# Patient Record
Sex: MALE | Race: WHITE | NOT HISPANIC OR LATINO | Employment: OTHER | URBAN - METROPOLITAN AREA
[De-identification: names, ages, dates, MRNs, and addresses within clinical notes are randomized per-mention and may not be internally consistent; named-entity substitution may affect disease eponyms.]

---

## 2019-05-20 ENCOUNTER — TELEPHONE (OUTPATIENT)
Dept: GASTROENTEROLOGY | Facility: CLINIC | Age: 64
End: 2019-05-20

## 2019-05-20 NOTE — TELEPHONE ENCOUNTER
----- Message from Caitlin Bonilla sent at 5/20/2019  3:11 PM CDT -----  Contact: Lázaro  Type:  RX Refill Request    Who Called:  patient  Refill or New Rx:  refill  RX Name and Strength:  Omeprazole 20mg  How is the patient currently taking it? (ex. 1XDay):  BID  Is this a 30 day or 90 day RX:  30  Preferred Pharmacy with phone number:      [id number] - [place], [place] - [address]  [address]  [place] [address]  Phone: [phone] Fax: [phone]    Local or Mail Order:  local  Ordering Provider:  Shon Vázquez Call Back Number:  [phone]  Additional Information:  Patient will be out of medication on Friday--please send it refill--please advise--thank you

## 2019-05-20 NOTE — TELEPHONE ENCOUNTER
Returned pt phone call regarding refill. Explained to pt that he needed to be an established pt at [facility] in order to have Dr. Menendez write a script. Offered pt an earlier appt, but he stated he is on jury duty this week. Informed pt he could buy some OTC to hold him over for the week. Pt verbalized understanding.

## 2019-10-18 ENCOUNTER — HOSPITAL ENCOUNTER (INPATIENT)
Facility: HOSPITAL | Age: 64
LOS: 6 days | Discharge: HOSPICE | DRG: 273 | End: 2019-10-24
Attending: INTERNAL MEDICINE | Admitting: INTERNAL MEDICINE
Payer: MEDICARE

## 2019-10-18 DIAGNOSIS — Z79.4 TYPE 2 DIABETES MELLITUS WITH DIABETIC POLYNEUROPATHY, WITH LONG-TERM CURRENT USE OF INSULIN: ICD-10-CM

## 2019-10-18 DIAGNOSIS — I47.20 VT (VENTRICULAR TACHYCARDIA): ICD-10-CM

## 2019-10-18 DIAGNOSIS — I25.10 CORONARY ARTERY DISEASE INVOLVING NATIVE CORONARY ARTERY OF NATIVE HEART WITHOUT ANGINA PECTORIS: ICD-10-CM

## 2019-10-18 DIAGNOSIS — I50.9 HEART FAILURE: ICD-10-CM

## 2019-10-18 DIAGNOSIS — E11.42 TYPE 2 DIABETES MELLITUS WITH DIABETIC POLYNEUROPATHY, WITH LONG-TERM CURRENT USE OF INSULIN: ICD-10-CM

## 2019-10-18 DIAGNOSIS — I47.20 VENTRICULAR TACHYCARDIA: ICD-10-CM

## 2019-10-18 DIAGNOSIS — Z51.5 PALLIATIVE CARE ENCOUNTER: ICD-10-CM

## 2019-10-18 DIAGNOSIS — I73.9 PAD (PERIPHERAL ARTERY DISEASE): ICD-10-CM

## 2019-10-18 DIAGNOSIS — I25.5 ISCHEMIC CARDIOMYOPATHY: ICD-10-CM

## 2019-10-18 DIAGNOSIS — I47.20 V-TACH: ICD-10-CM

## 2019-10-18 DIAGNOSIS — E87.1 HYPONATREMIA: ICD-10-CM

## 2019-10-18 DIAGNOSIS — Z71.89 GOALS OF CARE, COUNSELING/DISCUSSION: ICD-10-CM

## 2019-10-18 DIAGNOSIS — F41.9 ANXIETY: ICD-10-CM

## 2019-10-18 DIAGNOSIS — R06.00 DYSPNEA, UNSPECIFIED TYPE: ICD-10-CM

## 2019-10-18 DIAGNOSIS — I50.23 ACUTE ON CHRONIC SYSTOLIC HEART FAILURE: ICD-10-CM

## 2019-10-18 DIAGNOSIS — I73.9 PVD (PERIPHERAL VASCULAR DISEASE): ICD-10-CM

## 2019-10-18 DIAGNOSIS — I47.20 SUSTAINED VENTRICULAR TACHYCARDIA: ICD-10-CM

## 2019-10-18 DIAGNOSIS — E66.01 CLASS 2 SEVERE OBESITY DUE TO EXCESS CALORIES WITH SERIOUS COMORBIDITY AND BODY MASS INDEX (BMI) OF 39.0 TO 39.9 IN ADULT: ICD-10-CM

## 2019-10-18 DIAGNOSIS — E78.5 HYPERLIPIDEMIA WITH TARGET LDL LESS THAN 70: ICD-10-CM

## 2019-10-18 PROBLEM — Z45.02 IMPLANTABLE CARDIOVERTER-DEFIBRILLATOR (ICD) GENERATOR END OF LIFE: Status: ACTIVE | Noted: 2018-05-18

## 2019-10-18 PROBLEM — J96.01 ACUTE HYPOXEMIC RESPIRATORY FAILURE: Status: ACTIVE | Noted: 2019-10-18

## 2019-10-18 PROBLEM — D72.829 LEUKOCYTOSIS: Status: RESOLVED | Noted: 2019-10-16 | Resolved: 2019-10-18

## 2019-10-18 PROBLEM — E11.49 DIABETES MELLITUS WITH NEUROLOGICAL MANIFESTATIONS: Status: ACTIVE | Noted: 2019-10-16

## 2019-10-18 PROBLEM — D72.829 LEUKOCYTOSIS: Status: ACTIVE | Noted: 2019-10-16

## 2019-10-18 PROBLEM — Z95.1 HISTORY OF CORONARY ARTERY BYPASS GRAFT X 3: Status: ACTIVE | Noted: 2019-10-16

## 2019-10-18 PROBLEM — N39.0 UTI (URINARY TRACT INFECTION): Status: ACTIVE | Noted: 2019-10-18

## 2019-10-18 PROBLEM — T78.3XXA ANGIOEDEMA: Status: ACTIVE | Noted: 2019-10-16

## 2019-10-18 LAB
ALBUMIN SERPL BCP-MCNC: 2.9 G/DL (ref 3.5–5.2)
ALP SERPL-CCNC: 128 U/L (ref 55–135)
ALT SERPL W/O P-5'-P-CCNC: 8 U/L (ref 10–44)
ANION GAP SERPL CALC-SCNC: 12 MMOL/L (ref 8–16)
ANION GAP SERPL CALC-SCNC: 12 MMOL/L (ref 8–16)
APTT BLDCRRT: 28 SEC (ref 21–32)
AST SERPL-CCNC: 15 U/L (ref 10–40)
BILIRUB SERPL-MCNC: 0.4 MG/DL (ref 0.1–1)
BNP SERPL-MCNC: 261 PG/ML (ref 0–99)
BUN SERPL-MCNC: 56 MG/DL (ref 8–23)
BUN SERPL-MCNC: 56 MG/DL (ref 8–23)
CALCIUM SERPL-MCNC: 9.6 MG/DL (ref 8.7–10.5)
CALCIUM SERPL-MCNC: 9.6 MG/DL (ref 8.7–10.5)
CHLORIDE SERPL-SCNC: 89 MMOL/L (ref 95–110)
CHLORIDE SERPL-SCNC: 89 MMOL/L (ref 95–110)
CO2 SERPL-SCNC: 27 MMOL/L (ref 23–29)
CO2 SERPL-SCNC: 27 MMOL/L (ref 23–29)
CREAT SERPL-MCNC: 1.3 MG/DL (ref 0.5–1.4)
CREAT SERPL-MCNC: 1.3 MG/DL (ref 0.5–1.4)
EST. GFR  (AFRICAN AMERICAN): >60 ML/MIN/1.73 M^2
EST. GFR  (AFRICAN AMERICAN): >60 ML/MIN/1.73 M^2
EST. GFR  (NON AFRICAN AMERICAN): 57.7 ML/MIN/1.73 M^2
EST. GFR  (NON AFRICAN AMERICAN): 57.7 ML/MIN/1.73 M^2
GLUCOSE SERPL-MCNC: 205 MG/DL (ref 70–110)
GLUCOSE SERPL-MCNC: 205 MG/DL (ref 70–110)
INR PPP: 1.3 (ref 0.8–1.2)
LACTATE SERPL-SCNC: 1.2 MMOL/L (ref 0.5–2.2)
LIPASE SERPL-CCNC: 13 U/L (ref 4–60)
MAGNESIUM SERPL-MCNC: 2.4 MG/DL (ref 1.6–2.6)
PHOSPHATE SERPL-MCNC: 3 MG/DL (ref 2.7–4.5)
POCT GLUCOSE: 183 MG/DL (ref 70–110)
POTASSIUM SERPL-SCNC: 4 MMOL/L (ref 3.5–5.1)
POTASSIUM SERPL-SCNC: 4 MMOL/L (ref 3.5–5.1)
PROT SERPL-MCNC: 7.2 G/DL (ref 6–8.4)
PROTHROMBIN TIME: 12.7 SEC (ref 9–12.5)
SODIUM SERPL-SCNC: 128 MMOL/L (ref 136–145)
SODIUM SERPL-SCNC: 128 MMOL/L (ref 136–145)
TROPONIN I SERPL DL<=0.01 NG/ML-MCNC: 0.01 NG/ML (ref 0–0.03)

## 2019-10-18 PROCEDURE — 85610 PROTHROMBIN TIME: CPT

## 2019-10-18 PROCEDURE — 84484 ASSAY OF TROPONIN QUANT: CPT

## 2019-10-18 PROCEDURE — 83036 HEMOGLOBIN GLYCOSYLATED A1C: CPT

## 2019-10-18 PROCEDURE — 83605 ASSAY OF LACTIC ACID: CPT

## 2019-10-18 PROCEDURE — 80053 COMPREHEN METABOLIC PANEL: CPT

## 2019-10-18 PROCEDURE — 63600175 PHARM REV CODE 636 W HCPCS: Performed by: STUDENT IN AN ORGANIZED HEALTH CARE EDUCATION/TRAINING PROGRAM

## 2019-10-18 PROCEDURE — 36415 COLL VENOUS BLD VENIPUNCTURE: CPT

## 2019-10-18 PROCEDURE — 20000000 HC ICU ROOM

## 2019-10-18 PROCEDURE — 85025 COMPLETE CBC W/AUTO DIFF WBC: CPT

## 2019-10-18 PROCEDURE — 93010 ELECTROCARDIOGRAM REPORT: CPT | Mod: ,,, | Performed by: INTERNAL MEDICINE

## 2019-10-18 PROCEDURE — 85730 THROMBOPLASTIN TIME PARTIAL: CPT

## 2019-10-18 PROCEDURE — 93010 EKG 12-LEAD: ICD-10-PCS | Mod: ,,, | Performed by: INTERNAL MEDICINE

## 2019-10-18 PROCEDURE — 84100 ASSAY OF PHOSPHORUS: CPT

## 2019-10-18 PROCEDURE — 83735 ASSAY OF MAGNESIUM: CPT

## 2019-10-18 PROCEDURE — 83880 ASSAY OF NATRIURETIC PEPTIDE: CPT

## 2019-10-18 PROCEDURE — 83690 ASSAY OF LIPASE: CPT

## 2019-10-18 PROCEDURE — 93005 ELECTROCARDIOGRAM TRACING: CPT

## 2019-10-18 RX ORDER — DORZOLAMIDE HYDROCHLORIDE AND TIMOLOL MALEATE 20; 5 MG/ML; MG/ML
SOLUTION/ DROPS OPHTHALMIC
COMMUNITY
Start: 2019-07-30

## 2019-10-18 RX ORDER — GEMFIBROZIL 600 MG/1
TABLET, FILM COATED ORAL
Status: ON HOLD | COMMUNITY
Start: 2019-07-29 | End: 2019-10-24 | Stop reason: HOSPADM

## 2019-10-18 RX ORDER — SODIUM CHLORIDE 0.9 % (FLUSH) 0.9 %
10 SYRINGE (ML) INJECTION
Status: DISCONTINUED | OUTPATIENT
Start: 2019-10-18 | End: 2019-10-24 | Stop reason: HOSPADM

## 2019-10-18 RX ORDER — HYDROXYZINE PAMOATE 50 MG/1
CAPSULE ORAL
Refills: 3 | COMMUNITY
Start: 2019-10-07

## 2019-10-18 RX ORDER — CARVEDILOL 12.5 MG/1
12.5 TABLET ORAL 2 TIMES DAILY
Status: DISCONTINUED | OUTPATIENT
Start: 2019-10-19 | End: 2019-10-22

## 2019-10-18 RX ORDER — ASPIRIN 81 MG/1
81 TABLET ORAL DAILY
Status: DISCONTINUED | OUTPATIENT
Start: 2019-10-19 | End: 2019-10-24 | Stop reason: HOSPADM

## 2019-10-18 RX ORDER — CARVEDILOL 25 MG/1
TABLET ORAL
Status: ON HOLD | COMMUNITY
Start: 2019-09-25 | End: 2019-10-24 | Stop reason: HOSPADM

## 2019-10-18 RX ORDER — LIDOCAINE HYDROCHLORIDE ANHYDROUS AND DEXTROSE MONOHYDRATE .8; 5 G/100ML; G/100ML
2 INJECTION, SOLUTION INTRAVENOUS CONTINUOUS
Status: DISCONTINUED | OUTPATIENT
Start: 2019-10-18 | End: 2019-10-21

## 2019-10-18 RX ORDER — KETOROLAC TROMETHAMINE 10 MG/1
TABLET, FILM COATED ORAL
Refills: 0 | COMMUNITY
Start: 2019-10-01

## 2019-10-18 RX ORDER — OMEPRAZOLE 20 MG/1
CAPSULE, DELAYED RELEASE ORAL
COMMUNITY
Start: 2019-09-25

## 2019-10-18 RX ORDER — INSULIN ASPART 100 [IU]/ML
INJECTION, SOLUTION INTRAVENOUS; SUBCUTANEOUS
COMMUNITY
Start: 2019-08-06

## 2019-10-18 RX ORDER — QUETIAPINE FUMARATE 100 MG/1
100 TABLET, FILM COATED ORAL
COMMUNITY

## 2019-10-18 RX ORDER — GABAPENTIN 300 MG/1
300 CAPSULE ORAL 2 TIMES DAILY
Refills: 3 | COMMUNITY
Start: 2019-09-24

## 2019-10-18 RX ORDER — INSULIN ASPART 100 [IU]/ML
3 INJECTION, SOLUTION INTRAVENOUS; SUBCUTANEOUS
Status: DISCONTINUED | OUTPATIENT
Start: 2019-10-19 | End: 2019-10-24 | Stop reason: HOSPADM

## 2019-10-18 RX ORDER — ISOPROPYL ALCOHOL 0.75 G/1
SWAB TOPICAL
COMMUNITY
Start: 2019-09-20

## 2019-10-18 RX ORDER — FUROSEMIDE 10 MG/ML
80 INJECTION INTRAMUSCULAR; INTRAVENOUS 3 TIMES DAILY
Status: DISCONTINUED | OUTPATIENT
Start: 2019-10-19 | End: 2019-10-19

## 2019-10-18 RX ORDER — ERGOCALCIFEROL 1.25 MG/1
CAPSULE ORAL
Status: ON HOLD | COMMUNITY
Start: 2019-09-03 | End: 2019-10-24 | Stop reason: HOSPADM

## 2019-10-18 RX ORDER — GLUCAGON 1 MG
1 KIT INJECTION
Status: DISCONTINUED | OUTPATIENT
Start: 2019-10-18 | End: 2019-10-24

## 2019-10-18 RX ORDER — AMLODIPINE BESYLATE 2.5 MG/1
TABLET ORAL
Status: ON HOLD | COMMUNITY
Start: 2019-07-29 | End: 2019-10-24 | Stop reason: HOSPADM

## 2019-10-18 RX ORDER — FLUOXETINE HYDROCHLORIDE 20 MG/1
60 CAPSULE ORAL
COMMUNITY

## 2019-10-18 RX ORDER — BACLOFEN 20 MG/1
TABLET ORAL
Refills: 0 | COMMUNITY
Start: 2019-10-01

## 2019-10-18 RX ADMIN — LIDOCAINE HYDROCHLORIDE 3 MG/MIN: 8 INJECTION, SOLUTION INTRAVENOUS at 11:10

## 2019-10-18 RX ADMIN — AMIODARONE HYDROCHLORIDE 1 MG/MIN: 1.8 INJECTION, SOLUTION INTRAVENOUS at 09:10

## 2019-10-19 LAB
ALBUMIN SERPL BCP-MCNC: 2.8 G/DL (ref 3.5–5.2)
ALP SERPL-CCNC: 124 U/L (ref 55–135)
ALT SERPL W/O P-5'-P-CCNC: 9 U/L (ref 10–44)
ANION GAP SERPL CALC-SCNC: 14 MMOL/L (ref 8–16)
ANION GAP SERPL CALC-SCNC: 16 MMOL/L (ref 8–16)
ASCENDING AORTA: 3.02 CM
AST SERPL-CCNC: 14 U/L (ref 10–40)
AV INDEX (PROSTH): 0.74
AV MEAN GRADIENT: 3 MMHG
AV PEAK GRADIENT: 5 MMHG
AV VALVE AREA: 3.51 CM2
AV VELOCITY RATIO: 0.69
BASOPHILS # BLD AUTO: 0.02 K/UL (ref 0–0.2)
BASOPHILS # BLD AUTO: 0.02 K/UL (ref 0–0.2)
BASOPHILS NFR BLD: 0.2 % (ref 0–1.9)
BASOPHILS NFR BLD: 0.2 % (ref 0–1.9)
BILIRUB SERPL-MCNC: 0.3 MG/DL (ref 0.1–1)
BSA FOR ECHO PROCEDURE: 2.43 M2
BUN SERPL-MCNC: 56 MG/DL (ref 8–23)
BUN SERPL-MCNC: 57 MG/DL (ref 8–23)
CALCIUM SERPL-MCNC: 10 MG/DL (ref 8.7–10.5)
CALCIUM SERPL-MCNC: 9.9 MG/DL (ref 8.7–10.5)
CHLORIDE SERPL-SCNC: 88 MMOL/L (ref 95–110)
CHLORIDE SERPL-SCNC: 91 MMOL/L (ref 95–110)
CHOLEST SERPL-MCNC: 200 MG/DL (ref 120–199)
CHOLEST/HDLC SERPL: 11.8 {RATIO} (ref 2–5)
CO2 SERPL-SCNC: 26 MMOL/L (ref 23–29)
CO2 SERPL-SCNC: 26 MMOL/L (ref 23–29)
CREAT SERPL-MCNC: 1.4 MG/DL (ref 0.5–1.4)
CREAT SERPL-MCNC: 1.6 MG/DL (ref 0.5–1.4)
CV ECHO LV RWT: 0.36 CM
DIFFERENTIAL METHOD: ABNORMAL
DIFFERENTIAL METHOD: ABNORMAL
DOP CALC AO PEAK VEL: 1.08 M/S
DOP CALC AO VTI: 15.18 CM
DOP CALC LVOT AREA: 4.8 CM2
DOP CALC LVOT DIAMETER: 2.46 CM
DOP CALC LVOT PEAK VEL: 0.74 M/S
DOP CALC LVOT STROKE VOLUME: 53.3 CM3
DOP CALCLVOT PEAK VEL VTI: 11.22 CM
E WAVE DECELERATION TIME: 176.84 MSEC
E/A RATIO: 0.32
E/E' RATIO: 7 M/S
ECHO LV POSTERIOR WALL: 1.11 CM (ref 0.6–1.1)
EOSINOPHIL # BLD AUTO: 0.1 K/UL (ref 0–0.5)
EOSINOPHIL # BLD AUTO: 0.1 K/UL (ref 0–0.5)
EOSINOPHIL NFR BLD: 0.7 % (ref 0–8)
EOSINOPHIL NFR BLD: 0.9 % (ref 0–8)
ERYTHROCYTE [DISTWIDTH] IN BLOOD BY AUTOMATED COUNT: 12.1 % (ref 11.5–14.5)
ERYTHROCYTE [DISTWIDTH] IN BLOOD BY AUTOMATED COUNT: 12.3 % (ref 11.5–14.5)
EST. GFR  (AFRICAN AMERICAN): 51.9 ML/MIN/1.73 M^2
EST. GFR  (AFRICAN AMERICAN): >60 ML/MIN/1.73 M^2
EST. GFR  (NON AFRICAN AMERICAN): 44.9 ML/MIN/1.73 M^2
EST. GFR  (NON AFRICAN AMERICAN): 52.7 ML/MIN/1.73 M^2
ESTIMATED AVG GLUCOSE: 154 MG/DL (ref 68–131)
FRACTIONAL SHORTENING: 9 % (ref 28–44)
GLUCOSE SERPL-MCNC: 248 MG/DL (ref 70–110)
GLUCOSE SERPL-MCNC: 293 MG/DL (ref 70–110)
HBA1C MFR BLD HPLC: 7 % (ref 4–5.6)
HCT VFR BLD AUTO: 39 % (ref 40–54)
HCT VFR BLD AUTO: 40.8 % (ref 40–54)
HDLC SERPL-MCNC: 17 MG/DL (ref 40–75)
HDLC SERPL: 8.5 % (ref 20–50)
HGB BLD-MCNC: 12.6 G/DL (ref 14–18)
HGB BLD-MCNC: 13 G/DL (ref 14–18)
IMM GRANULOCYTES # BLD AUTO: 0.06 K/UL (ref 0–0.04)
IMM GRANULOCYTES # BLD AUTO: 0.08 K/UL (ref 0–0.04)
IMM GRANULOCYTES NFR BLD AUTO: 0.5 % (ref 0–0.5)
IMM GRANULOCYTES NFR BLD AUTO: 0.7 % (ref 0–0.5)
INTERVENTRICULAR SEPTUM: 0.97 CM (ref 0.6–1.1)
IVRT: 0.1 MSEC
LA MAJOR: 6 CM
LA MINOR: 5.96 CM
LA WIDTH: 4.55 CM
LDLC SERPL CALC-MCNC: 110.2 MG/DL (ref 63–159)
LEFT ATRIUM SIZE: 4.29 CM
LEFT ATRIUM VOLUME INDEX: 42.5 ML/M2
LEFT ATRIUM VOLUME: 99.22 CM3
LEFT INTERNAL DIMENSION IN SYSTOLE: 5.61 CM (ref 2.1–4)
LEFT VENTRICLE DIASTOLIC VOLUME INDEX: 81.21 ML/M2
LEFT VENTRICLE DIASTOLIC VOLUME: 189.68 ML
LEFT VENTRICLE MASS INDEX: 116 G/M2
LEFT VENTRICLE SYSTOLIC VOLUME INDEX: 66 ML/M2
LEFT VENTRICLE SYSTOLIC VOLUME: 154.15 ML
LEFT VENTRICULAR INTERNAL DIMENSION IN DIASTOLE: 6.14 CM (ref 3.5–6)
LEFT VENTRICULAR MASS: 270.12 G
LV LATERAL E/E' RATIO: 5.83 M/S
LV SEPTAL E/E' RATIO: 8.75 M/S
LYMPHOCYTES # BLD AUTO: 0.9 K/UL (ref 1–4.8)
LYMPHOCYTES # BLD AUTO: 1.4 K/UL (ref 1–4.8)
LYMPHOCYTES NFR BLD: 12 % (ref 18–48)
LYMPHOCYTES NFR BLD: 7.7 % (ref 18–48)
MAGNESIUM SERPL-MCNC: 2 MG/DL (ref 1.6–2.6)
MAGNESIUM SERPL-MCNC: 2.2 MG/DL (ref 1.6–2.6)
MCH RBC QN AUTO: 28.4 PG (ref 27–31)
MCH RBC QN AUTO: 28.6 PG (ref 27–31)
MCHC RBC AUTO-ENTMCNC: 31.9 G/DL (ref 32–36)
MCHC RBC AUTO-ENTMCNC: 32.3 G/DL (ref 32–36)
MCV RBC AUTO: 89 FL (ref 82–98)
MCV RBC AUTO: 89 FL (ref 82–98)
MONOCYTES # BLD AUTO: 1.3 K/UL (ref 0.3–1)
MONOCYTES # BLD AUTO: 1.3 K/UL (ref 0.3–1)
MONOCYTES NFR BLD: 10.8 % (ref 4–15)
MONOCYTES NFR BLD: 10.9 % (ref 4–15)
MV PEAK A VEL: 1.1 M/S
MV PEAK E VEL: 0.35 M/S
NEUTROPHILS # BLD AUTO: 8.8 K/UL (ref 1.8–7.7)
NEUTROPHILS # BLD AUTO: 9.3 K/UL (ref 1.8–7.7)
NEUTROPHILS NFR BLD: 75.8 % (ref 38–73)
NEUTROPHILS NFR BLD: 79.6 % (ref 38–73)
NONHDLC SERPL-MCNC: 183 MG/DL
NRBC BLD-RTO: 0 /100 WBC
NRBC BLD-RTO: 0 /100 WBC
PHOSPHATE SERPL-MCNC: 3.3 MG/DL (ref 2.7–4.5)
PLATELET # BLD AUTO: 399 K/UL (ref 150–350)
PLATELET # BLD AUTO: 408 K/UL (ref 150–350)
PMV BLD AUTO: 10.4 FL (ref 9.2–12.9)
PMV BLD AUTO: 10.6 FL (ref 9.2–12.9)
POCT GLUCOSE: 206 MG/DL (ref 70–110)
POCT GLUCOSE: 232 MG/DL (ref 70–110)
POCT GLUCOSE: 285 MG/DL (ref 70–110)
POTASSIUM SERPL-SCNC: 3.7 MMOL/L (ref 3.5–5.1)
POTASSIUM SERPL-SCNC: 4 MMOL/L (ref 3.5–5.1)
PROT SERPL-MCNC: 7 G/DL (ref 6–8.4)
PULM VEIN S/D RATIO: 2.75
PV PEAK D VEL: 0.16 M/S
PV PEAK S VEL: 0.44 M/S
RA MAJOR: 5.34 CM
RA PRESSURE: 3 MMHG
RA WIDTH: 4.55 CM
RBC # BLD AUTO: 4.4 M/UL (ref 4.6–6.2)
RBC # BLD AUTO: 4.57 M/UL (ref 4.6–6.2)
RIGHT VENTRICULAR END-DIASTOLIC DIMENSION: 3.89 CM
RV TISSUE DOPPLER FREE WALL SYSTOLIC VELOCITY 1 (APICAL 4 CHAMBER VIEW): 11.14 CM/S
SINUS: 3.47 CM
SODIUM SERPL-SCNC: 130 MMOL/L (ref 136–145)
SODIUM SERPL-SCNC: 131 MMOL/L (ref 136–145)
STJ: 3.07 CM
TDI LATERAL: 0.06 M/S
TDI SEPTAL: 0.04 M/S
TDI: 0.05 M/S
TRICUSPID ANNULAR PLANE SYSTOLIC EXCURSION: 1.32 CM
TRIGL SERPL-MCNC: 364 MG/DL (ref 30–150)
WBC # BLD AUTO: 11.57 K/UL (ref 3.9–12.7)
WBC # BLD AUTO: 11.65 K/UL (ref 3.9–12.7)

## 2019-10-19 PROCEDURE — 99900035 HC TECH TIME PER 15 MIN (STAT)

## 2019-10-19 PROCEDURE — 99223 1ST HOSP IP/OBS HIGH 75: CPT | Mod: ,,, | Performed by: INTERNAL MEDICINE

## 2019-10-19 PROCEDURE — 25000003 PHARM REV CODE 250: Performed by: STUDENT IN AN ORGANIZED HEALTH CARE EDUCATION/TRAINING PROGRAM

## 2019-10-19 PROCEDURE — 80061 LIPID PANEL: CPT

## 2019-10-19 PROCEDURE — 84100 ASSAY OF PHOSPHORUS: CPT

## 2019-10-19 PROCEDURE — 99223 1ST HOSP IP/OBS HIGH 75: CPT | Mod: AI,GC,, | Performed by: INTERNAL MEDICINE

## 2019-10-19 PROCEDURE — 83735 ASSAY OF MAGNESIUM: CPT

## 2019-10-19 PROCEDURE — 80176 ASSAY OF LIDOCAINE: CPT

## 2019-10-19 PROCEDURE — 85025 COMPLETE CBC W/AUTO DIFF WBC: CPT

## 2019-10-19 PROCEDURE — 27000190 HC CPAP FULL FACE MASK W/VALVE

## 2019-10-19 PROCEDURE — 63600175 PHARM REV CODE 636 W HCPCS: Performed by: STUDENT IN AN ORGANIZED HEALTH CARE EDUCATION/TRAINING PROGRAM

## 2019-10-19 PROCEDURE — 25000003 PHARM REV CODE 250: Performed by: INTERNAL MEDICINE

## 2019-10-19 PROCEDURE — 80048 BASIC METABOLIC PNL TOTAL CA: CPT

## 2019-10-19 PROCEDURE — 63600175 PHARM REV CODE 636 W HCPCS

## 2019-10-19 PROCEDURE — 83735 ASSAY OF MAGNESIUM: CPT | Mod: 91

## 2019-10-19 PROCEDURE — 20000000 HC ICU ROOM

## 2019-10-19 PROCEDURE — 99223 PR INITIAL HOSPITAL CARE,LEVL III: ICD-10-PCS | Mod: AI,GC,, | Performed by: INTERNAL MEDICINE

## 2019-10-19 PROCEDURE — 94660 CPAP INITIATION&MGMT: CPT

## 2019-10-19 PROCEDURE — 99223 PR INITIAL HOSPITAL CARE,LEVL III: ICD-10-PCS | Mod: ,,, | Performed by: INTERNAL MEDICINE

## 2019-10-19 PROCEDURE — 84132 ASSAY OF SERUM POTASSIUM: CPT

## 2019-10-19 PROCEDURE — 80053 COMPREHEN METABOLIC PANEL: CPT

## 2019-10-19 RX ORDER — IBUPROFEN 200 MG
24 TABLET ORAL
Status: DISCONTINUED | OUTPATIENT
Start: 2019-10-19 | End: 2019-10-24 | Stop reason: HOSPADM

## 2019-10-19 RX ORDER — LORAZEPAM 2 MG/ML
2 INJECTION INTRAMUSCULAR ONCE
Status: COMPLETED | OUTPATIENT
Start: 2019-10-19 | End: 2019-10-19

## 2019-10-19 RX ORDER — MIDAZOLAM HYDROCHLORIDE 1 MG/ML
INJECTION INTRAMUSCULAR; INTRAVENOUS
Status: COMPLETED
Start: 2019-10-19 | End: 2019-10-19

## 2019-10-19 RX ORDER — ISOSORBIDE DINITRATE AND HYDRALAZINE HYDROCHLORIDE 37.5; 2 MG/1; MG/1
1 TABLET ORAL 3 TIMES DAILY
Status: DISCONTINUED | OUTPATIENT
Start: 2019-10-19 | End: 2019-10-19

## 2019-10-19 RX ORDER — ISOSORBIDE DINITRATE AND HYDRALAZINE HYDROCHLORIDE 37.5; 2 MG/1; MG/1
1 TABLET ORAL 3 TIMES DAILY
Status: DISCONTINUED | OUTPATIENT
Start: 2019-10-19 | End: 2019-10-24 | Stop reason: HOSPADM

## 2019-10-19 RX ORDER — MIDAZOLAM HYDROCHLORIDE 1 MG/ML
4 INJECTION INTRAMUSCULAR; INTRAVENOUS ONCE
Status: DISCONTINUED | OUTPATIENT
Start: 2019-10-19 | End: 2019-10-19

## 2019-10-19 RX ORDER — LORAZEPAM 2 MG/ML
INJECTION INTRAMUSCULAR
Status: COMPLETED
Start: 2019-10-19 | End: 2019-10-19

## 2019-10-19 RX ORDER — DORZOLAMIDE HCL 20 MG/ML
1 SOLUTION/ DROPS OPHTHALMIC 2 TIMES DAILY
Status: DISCONTINUED | OUTPATIENT
Start: 2019-10-19 | End: 2019-10-24 | Stop reason: HOSPADM

## 2019-10-19 RX ORDER — GLUCAGON 1 MG
1 KIT INJECTION
Status: DISCONTINUED | OUTPATIENT
Start: 2019-10-19 | End: 2019-10-24 | Stop reason: HOSPADM

## 2019-10-19 RX ORDER — IBUPROFEN 200 MG
16 TABLET ORAL
Status: DISCONTINUED | OUTPATIENT
Start: 2019-10-19 | End: 2019-10-24 | Stop reason: HOSPADM

## 2019-10-19 RX ORDER — GABAPENTIN 300 MG/1
300 CAPSULE ORAL NIGHTLY
Status: DISCONTINUED | OUTPATIENT
Start: 2019-10-19 | End: 2019-10-24 | Stop reason: HOSPADM

## 2019-10-19 RX ORDER — POTASSIUM CHLORIDE 20 MEQ/1
40 TABLET, EXTENDED RELEASE ORAL ONCE
Status: COMPLETED | OUTPATIENT
Start: 2019-10-19 | End: 2019-10-19

## 2019-10-19 RX ORDER — INSULIN ASPART 100 [IU]/ML
1-10 INJECTION, SOLUTION INTRAVENOUS; SUBCUTANEOUS
Status: DISCONTINUED | OUTPATIENT
Start: 2019-10-19 | End: 2019-10-24 | Stop reason: HOSPADM

## 2019-10-19 RX ORDER — MAGNESIUM SULFATE HEPTAHYDRATE 40 MG/ML
2 INJECTION, SOLUTION INTRAVENOUS ONCE
Status: COMPLETED | OUTPATIENT
Start: 2019-10-19 | End: 2019-10-19

## 2019-10-19 RX ORDER — DORZOLAMIDE HYDROCHLORIDE AND TIMOLOL MALEATE 20; 5 MG/ML; MG/ML
1 SOLUTION/ DROPS OPHTHALMIC 2 TIMES DAILY
Status: DISCONTINUED | OUTPATIENT
Start: 2019-10-19 | End: 2019-10-19

## 2019-10-19 RX ORDER — HYDROXYZINE PAMOATE 25 MG/1
50 CAPSULE ORAL EVERY 6 HOURS PRN
Status: DISCONTINUED | OUTPATIENT
Start: 2019-10-19 | End: 2019-10-24 | Stop reason: HOSPADM

## 2019-10-19 RX ORDER — TIMOLOL MALEATE 5 MG/ML
1 SOLUTION/ DROPS OPHTHALMIC 2 TIMES DAILY
Status: DISCONTINUED | OUTPATIENT
Start: 2019-10-19 | End: 2019-10-24 | Stop reason: HOSPADM

## 2019-10-19 RX ADMIN — AMIODARONE HYDROCHLORIDE 150 MG: 1.5 INJECTION, SOLUTION INTRAVENOUS at 05:10

## 2019-10-19 RX ADMIN — GABAPENTIN 300 MG: 300 CAPSULE ORAL at 11:10

## 2019-10-19 RX ADMIN — POTASSIUM CHLORIDE 40 MEQ: 1500 TABLET, EXTENDED RELEASE ORAL at 06:10

## 2019-10-19 RX ADMIN — LIDOCAINE HYDROCHLORIDE 3 MG/MIN: 8 INJECTION, SOLUTION INTRAVENOUS at 08:10

## 2019-10-19 RX ADMIN — FUROSEMIDE 80 MG: 10 INJECTION, SOLUTION INTRAMUSCULAR; INTRAVENOUS at 09:10

## 2019-10-19 RX ADMIN — DORZOLAMIDE HYDROCHLORIDE 1 DROP: 20 SOLUTION/ DROPS OPHTHALMIC at 08:10

## 2019-10-19 RX ADMIN — LORAZEPAM 2 MG: 2 INJECTION INTRAMUSCULAR; INTRAVENOUS at 06:10

## 2019-10-19 RX ADMIN — FUROSEMIDE 80 MG: 10 INJECTION, SOLUTION INTRAMUSCULAR; INTRAVENOUS at 08:10

## 2019-10-19 RX ADMIN — HYDRALAZINE HYDROCHLORIDE AND ISOSORBIDE DINITRATE 1 TABLET: 37.5; 2 TABLET, FILM COATED ORAL at 03:10

## 2019-10-19 RX ADMIN — MAGNESIUM SULFATE IN WATER 2 G: 40 INJECTION, SOLUTION INTRAVENOUS at 06:10

## 2019-10-19 RX ADMIN — INSULIN ASPART 3 UNITS: 100 INJECTION, SOLUTION INTRAVENOUS; SUBCUTANEOUS at 08:10

## 2019-10-19 RX ADMIN — DORZOLAMIDE HYDROCHLORIDE AND TIMOLOL MALEATE 1 DROP: 20; 5 SOLUTION/ DROPS OPHTHALMIC at 09:10

## 2019-10-19 RX ADMIN — INSULIN ASPART 4 UNITS: 100 INJECTION, SOLUTION INTRAVENOUS; SUBCUTANEOUS at 05:10

## 2019-10-19 RX ADMIN — TIMOLOL MALEATE 1 DROP: 5 SOLUTION OPHTHALMIC at 08:10

## 2019-10-19 RX ADMIN — AMIODARONE HYDROCHLORIDE 1 MG/MIN: 1.8 INJECTION, SOLUTION INTRAVENOUS at 08:10

## 2019-10-19 RX ADMIN — AMIODARONE HYDROCHLORIDE 1 MG/MIN: 1.8 INJECTION, SOLUTION INTRAVENOUS at 02:10

## 2019-10-19 RX ADMIN — CARVEDILOL 12.5 MG: 12.5 TABLET, FILM COATED ORAL at 08:10

## 2019-10-19 RX ADMIN — CARVEDILOL 12.5 MG: 12.5 TABLET, FILM COATED ORAL at 01:10

## 2019-10-19 RX ADMIN — AMIODARONE HYDROCHLORIDE 1 MG/MIN: 1.8 INJECTION, SOLUTION INTRAVENOUS at 03:10

## 2019-10-19 RX ADMIN — FUROSEMIDE 80 MG: 10 INJECTION, SOLUTION INTRAMUSCULAR; INTRAVENOUS at 01:10

## 2019-10-19 RX ADMIN — FUROSEMIDE 80 MG: 10 INJECTION, SOLUTION INTRAMUSCULAR; INTRAVENOUS at 04:10

## 2019-10-19 RX ADMIN — MIDAZOLAM HYDROCHLORIDE 4 MG: 1 INJECTION, SOLUTION INTRAMUSCULAR; INTRAVENOUS at 08:10

## 2019-10-19 RX ADMIN — CARVEDILOL 12.5 MG: 12.5 TABLET, FILM COATED ORAL at 09:10

## 2019-10-19 RX ADMIN — INSULIN ASPART 3 UNITS: 100 INJECTION, SOLUTION INTRAVENOUS; SUBCUTANEOUS at 12:10

## 2019-10-19 RX ADMIN — LIDOCAINE HYDROCHLORIDE 3 MG/MIN: 8 INJECTION, SOLUTION INTRAVENOUS at 09:10

## 2019-10-19 RX ADMIN — INSULIN ASPART 3 UNITS: 100 INJECTION, SOLUTION INTRAVENOUS; SUBCUTANEOUS at 04:10

## 2019-10-19 RX ADMIN — ASPIRIN 81 MG: 81 TABLET, COATED ORAL at 09:10

## 2019-10-19 RX ADMIN — HYDRALAZINE HYDROCHLORIDE AND ISOSORBIDE DINITRATE 1 TABLET: 37.5; 2 TABLET, FILM COATED ORAL at 08:10

## 2019-10-19 RX ADMIN — HYDROXYZINE PAMOATE 50 MG: 25 CAPSULE ORAL at 11:10

## 2019-10-19 RX ADMIN — AMIODARONE HYDROCHLORIDE 1 MG/MIN: 1.8 INJECTION, SOLUTION INTRAVENOUS at 09:10

## 2019-10-19 NOTE — PROGRESS NOTES
"Sustained monomorphic VT on telemetry. Pt dizzy, stated he "felt funny." Was shocked by AICD x1. Cardiology called to bedside STAT. WCTM.  "

## 2019-10-19 NOTE — PROGRESS NOTES
Paced rhythm on telemetry. Cardiology notified x2 to come see pt s/p AICD fire, states someone will come soon. DANIS GARCIA.

## 2019-10-19 NOTE — CONSULTS
[facility]  Cardiac Electrophysiology  Consult Note    Admission Date: 10/18/2019  Code Status: Full Code   Attending Provider: Rea Davila MD  Consulting Provider: Ced Mittal MD  Principal Problem:Sustained ventricular tachycardia    Inpatient consult to Electrophysiology  Consult performed by: Ced Mittal MD  Consult ordered by: Ced Mittal MD        Subjective:     Chief Complaint:  ventricular tachycardia     HPI:   Mr. Sarah is a 63 y.o. Male with ischemic CMP EF 15-20%; CAD CABG 3/2008 (Radial-OM, LIMA to LAD and TOD to dRCA) AICD for HFrEF. He presented to [facility] on 10/16/2019 with multiple AICD shocks. He noticed the shock around 1030 PM the night of admission. During ED visit, he had reported episode of sustained monomorphic ventricular tachycardia 160 bpm. Per report he had multiple AICD shock for his VT storm, and he started on Amiodarone and Lidocaine infusion. Subsequently he VTac stabilized and he was switched from Lidocaine to mexiletine 150 mg TID. He underwent LHC and showed patent Radial-OM; LIMA-LAD; TOD to distal right disease to mLCX with collaterals to RCA. 90% stenosis to mLCX. Ventricualgraphy showed EF 10-15%/ No intervention. He also underwent RHC with reported normal results. On 10/17/2019; he developed recurrent episodes wide complex tachycardias mostly consistent with Ventricular tachycardia; and continue on Amiodarone and Lidocaine infusion. ICD re-programmed to detect slower VT rate of 120 given slow VT with a rate of 120-150 with variable morphologies. On arrival to [facility], he was hemodynamically stable and oxygen saturation on nasal cannula 2-4 L with 95%.    Past Medical History:   Diagnosis Date    Acute on chronic systolic heart failure 10/16/2019    Cancer     Skin Cancer     CHF (congestive heart failure)     Chronic rhinitis 10/4/2016    Depression 5/25/2016    Diabetes mellitus     Encounter for blood  "transfusion     during pt open heart surgery 1998    History of hypertension 10/4/2016    Hypertension     Implantable cardioverter-defibrillator (ICD) generator end of life 2018    Leukocytosis 10/16/2019       Past Surgical History:   Procedure Laterality Date    ABDOMINAL SURGERY      hernia repair    CARDIAC SURGERY      open heart    EYE SURGERY      HERNIA REPAIR      SKIN BIOPSY         Review of patient's allergies indicates:   Allergen Reactions    Ace inhibitors Swelling    Adhesive Hives    Novolin 70/30 (semi-synthetic) Hives    Codeine Nausea And Vomiting    Statins-hmg-coa reductase inhibitors Swelling    Bupropion hcl Rash       No current facility-administered medications on file prior to encounter.      No current outpatient medications on file prior to encounter.     Family History       None          Tobacco Use    Smoking status: Former Smoker     Types: Cigarettes     Last attempt to quit: 3/18/2006     Years since quittin.5   Substance and Sexual Activity    Alcohol use: Yes     Comment: per pt "once in a while"    Drug use: Yes     Comment: per pt "Carri on Percocet 2 yrs ago in July"    Sexual activity: Not Currently     Review of Systems   Constitution: Positive for decreased appetite and malaise/fatigue. Negative for chills and night sweats.   HENT: Negative for congestion.    Cardiovascular: Negative for chest pain, irregular heartbeat and leg swelling.   Respiratory: Negative for cough and shortness of breath.    Endocrine: Negative for cold intolerance and heat intolerance.   Skin: Negative for rash.   Musculoskeletal: Negative for arthritis and back pain.   Gastrointestinal: Negative for abdominal pain, constipation and diarrhea.   Genitourinary: Negative for dysuria and hematuria.   Neurological: Negative for dizziness and headaches.   Psychiatric/Behavioral: Negative for altered mental status.     Objective:     Vital Signs (Most Recent):  Pulse: 60 " (10/18/19 2300)  Resp: (!) 23 (10/18/19 2300)  SpO2: (!) 94 % (10/18/19 2300) Vital Signs (24h Range):  Pulse:  [] 60  Resp:  [13-32] 23  SpO2:  [94 %-98 %] 94 %  BP: ()/(60-65) 95/61     Weight: 121.4 kg (267 lb 10.2 oz)  There is no height or weight on file to calculate BMI.    SpO2: (!) 94 %  O2 Device (Oxygen Therapy): nasal cannula w/ humidification    No intake or output data in the 24 hours ending 10/18/19 2357    Lines/Drains/Airways       Peripheral Intravenous Line                   Peripheral IV - Single Lumen 10/17/19 18 G Anterior;Left Upper Arm 1 day         Peripheral IV - Single Lumen 10/17/19 18 G Anterior;Right Upper Arm 1 day                    Physical Exam   Constitutional: He is oriented to person, place, and time. He appears well-nourished. No distress.   HENT:   Head: Normocephalic.   Eyes: Pupils are equal, round, and reactive to light.   Neck: No JVD present. No thyromegaly present.   Cardiovascular: Normal rate and regular rhythm. Exam reveals no friction rub.   No murmur heard.  Pulmonary/Chest: Effort normal. No respiratory distress. He has no wheezes.   Abdominal: Soft. He exhibits no distension. There is no tenderness.   Musculoskeletal: He exhibits no edema.   Neurological: He is alert and oriented to person, place, and time.   Skin:        ICD [facility]    Vitals reviewed.      Significant Labs:   CMP   Recent Labs   Lab 10/18/19  2151   *  128*   K 4.0  4.0   CL 89*  89*   CO2 27  27   *  205*   BUN 56*  56*   CREATININE 1.3  1.3   CALCIUM 9.6  9.6   PROT 7.2   ALBUMIN 2.9*   BILITOT 0.4   ALKPHOS 128   AST 15   ALT 8*   ANIONGAP 12  12   ESTGFRAFRICA >60.0  >60.0   EGFRNONAA 57.7*  57.7*     Assessment and Plan:     * Sustained ventricular tachycardia  - Unable to obtain the events from the device interrogating (was cleared from prior interrogating)  - DDx TdP in the setting of having multiple medication could affect his QTc (anti psychotic, and  SSRI); ischemic etiology; although non obstructive but diseases mLCX   - Continue on Amiodarone and Lidocaine current with repeated lab level of Lidocaine  - Plan discussed with primary and EP teams.     Thank you for your consult. EP team will follow-up with patient. Please contact us if you have any additional questions.    Ced Mittal MD  Cardiac Electrophysiology  [facility]

## 2019-10-19 NOTE — SUBJECTIVE & OBJECTIVE
"Past Medical History:   Diagnosis Date    Acute on chronic systolic heart failure 10/16/2019    Cancer     Skin Cancer     CHF (congestive heart failure)     Chronic rhinitis 10/4/2016    Depression 2016    Diabetes mellitus     Encounter for blood transfusion     during pt open heart surgery 1998    History of hypertension 10/4/2016    Hypertension     Implantable cardioverter-defibrillator (ICD) generator end of life 2018    Leukocytosis 10/16/2019       Past Surgical History:   Procedure Laterality Date    ABDOMINAL SURGERY      hernia repair    CARDIAC SURGERY      open heart    EYE SURGERY      HERNIA REPAIR      SKIN BIOPSY         Review of patient's allergies indicates:   Allergen Reactions    Ace inhibitors Swelling    Adhesive Hives    Novolin 70/30 (semi-synthetic) Hives    Codeine Nausea And Vomiting    Statins-hmg-coa reductase inhibitors Swelling    Bupropion hcl Rash       No current facility-administered medications on file prior to encounter.      No current outpatient medications on file prior to encounter.     Family History     None        Tobacco Use    Smoking status: Former Smoker     Types: Cigarettes     Last attempt to quit: 3/18/2006     Years since quittin.5   Substance and Sexual Activity    Alcohol use: Yes     Comment: per pt "once in a while"    Drug use: Yes     Comment: per pt "Carri on Percocet 2 yrs ago in July"    Sexual activity: Not Currently     Review of Systems   Constitution: Positive for decreased appetite and malaise/fatigue. Negative for chills and night sweats.   HENT: Negative for congestion.    Cardiovascular: Negative for chest pain, irregular heartbeat and leg swelling.   Respiratory: Negative for cough and shortness of breath.    Endocrine: Negative for cold intolerance and heat intolerance.   Skin: Negative for rash.   Musculoskeletal: Negative for arthritis and back pain.   Gastrointestinal: Negative for abdominal pain, " constipation and diarrhea.   Genitourinary: Negative for dysuria and hematuria.   Neurological: Negative for dizziness and headaches.   Psychiatric/Behavioral: Negative for altered mental status.     Objective:     Vital Signs (Most Recent):  Pulse: 60 (10/18/19 2300)  Resp: (!) 23 (10/18/19 2300)  SpO2: (!) 94 % (10/18/19 2300) Vital Signs (24h Range):  Pulse:  [] 60  Resp:  [13-32] 23  SpO2:  [94 %-98 %] 94 %  BP: ()/(60-65) 95/61     Weight: 121.4 kg (267 lb 10.2 oz)  There is no height or weight on file to calculate BMI.    SpO2: (!) 94 %  O2 Device (Oxygen Therapy): nasal cannula w/ humidification    No intake or output data in the 24 hours ending 10/18/19 2338    Lines/Drains/Airways     Peripheral Intravenous Line                 Peripheral IV - Single Lumen 10/17/19 18 G Anterior;Left Upper Arm 1 day         Peripheral IV - Single Lumen 10/17/19 18 G Anterior;Right Upper Arm 1 day                Physical Exam   Constitutional: He is oriented to person, place, and time. He appears well-nourished. No distress.   HENT:   Head: Normocephalic.   Eyes: Pupils are equal, round, and reactive to light.   Neck: No JVD present. No thyromegaly present.   Cardiovascular: Normal rate and regular rhythm. Exam reveals no friction rub.   No murmur heard.  Pulmonary/Chest: Effort normal. No respiratory distress. He has no wheezes.   Abdominal: Soft. He exhibits no distension. There is no tenderness.   Musculoskeletal: He exhibits no edema.   Neurological: He is alert and oriented to person, place, and time.   Skin:        ICD [facility]    Vitals reviewed.      Significant Labs:   CMP   Recent Labs   Lab 10/18/19  2151   *  128*   K 4.0  4.0   CL 89*  89*   CO2 27  27   *  205*   BUN 56*  56*   CREATININE 1.3  1.3   CALCIUM 9.6  9.6   PROT 7.2   ALBUMIN 2.9*   BILITOT 0.4   ALKPHOS 128   AST 15   ALT 8*   ANIONGAP 12  12   ESTGFRAFRICA >60.0  >60.0   EGFRNONAA 57.7*  57.7*

## 2019-10-19 NOTE — PROCEDURES
ICD Evaluation Report     10/19/2019     : [facility]     Reason for evaluation: Evaluation for ICD shocks     Initial Parameters  Mode: DDD  Base Rate 60  Pause 1 sec  Duration 0.5 min     A V   Pulse Amplitude  7.5 V 7.5 V   Pulse Width  1.5 ms 1.5 ms       Capture  A V   ACap Confirm  Off Off   Pulse Amp 3.0 V 2.5 V   Pulse Width 0.5 ms 0.5 ms   AutoSens On On   Sensitivity  Auto Auto     Events:  Unfortunately prior device interrogation cleared prior events and I was unable to obtain prior V Tach events.     Noris Mittal MD  Cardiology Fellow ([id number]-IV)  Pager: [phone]

## 2019-10-19 NOTE — SUBJECTIVE & OBJECTIVE
"Past Medical History:   Diagnosis Date    Acute on chronic systolic heart failure 10/16/2019    Cancer     Skin Cancer     CHF (congestive heart failure)     Chronic rhinitis 10/4/2016    Depression 2016    Diabetes mellitus     Encounter for blood transfusion     during pt open heart surgery 1998    History of hypertension 10/4/2016    Hypertension     Implantable cardioverter-defibrillator (ICD) generator end of life 2018    Leukocytosis 10/16/2019       Past Surgical History:   Procedure Laterality Date    ABDOMINAL SURGERY      hernia repair    CARDIAC SURGERY      open heart    EYE SURGERY      HERNIA REPAIR      SKIN BIOPSY         Review of patient's allergies indicates:   Allergen Reactions    Ace inhibitors Swelling    Adhesive Hives    Novolin 70/30 (semi-synthetic) Hives    Codeine Nausea And Vomiting    Statins-hmg-coa reductase inhibitors Swelling    Bupropion hcl Rash       No current facility-administered medications on file prior to encounter.      No current outpatient medications on file prior to encounter.     Family History     None        Tobacco Use    Smoking status: Former Smoker     Types: Cigarettes     Last attempt to quit: 3/18/2006     Years since quittin.5   Substance and Sexual Activity    Alcohol use: Yes     Comment: per pt "once in a while"    Drug use: Yes     Comment: per pt "Carri on Percocet 2 yrs ago in July"    Sexual activity: Not Currently     Review of Systems   Constitution: Positive for decreased appetite and malaise/fatigue. Negative for chills and night sweats.   HENT: Negative for congestion.    Cardiovascular: Negative for chest pain, irregular heartbeat and leg swelling.   Respiratory: Negative for cough and shortness of breath.    Endocrine: Negative for cold intolerance and heat intolerance.   Skin: Negative for rash.   Musculoskeletal: Negative for arthritis and back pain.   Gastrointestinal: Negative for abdominal pain, " constipation and diarrhea.   Genitourinary: Negative for dysuria and hematuria.   Neurological: Negative for dizziness and headaches.   Psychiatric/Behavioral: Negative for altered mental status.     Objective:     Vital Signs (Most Recent):  Pulse: 60 (10/18/19 2300)  Resp: (!) 23 (10/18/19 2300)  SpO2: (!) 94 % (10/18/19 2300) Vital Signs (24h Range):  Pulse:  [] 60  Resp:  [13-32] 23  SpO2:  [94 %-98 %] 94 %  BP: ()/(60-65) 95/61     Weight: 121.4 kg (267 lb 10.2 oz)  There is no height or weight on file to calculate BMI.    SpO2: (!) 94 %  O2 Device (Oxygen Therapy): nasal cannula w/ humidification    No intake or output data in the 24 hours ending 10/18/19 2357    Lines/Drains/Airways     Peripheral Intravenous Line                 Peripheral IV - Single Lumen 10/17/19 18 G Anterior;Left Upper Arm 1 day         Peripheral IV - Single Lumen 10/17/19 18 G Anterior;Right Upper Arm 1 day                Physical Exam   Constitutional: He is oriented to person, place, and time. He appears well-nourished. No distress.   HENT:   Head: Normocephalic.   Eyes: Pupils are equal, round, and reactive to light.   Neck: No JVD present. No thyromegaly present.   Cardiovascular: Normal rate and regular rhythm. Exam reveals no friction rub.   No murmur heard.  Pulmonary/Chest: Effort normal. No respiratory distress. He has no wheezes.   Abdominal: Soft. He exhibits no distension. There is no tenderness.   Musculoskeletal: He exhibits no edema.   Neurological: He is alert and oriented to person, place, and time.   Skin:        ICD [facility]    Vitals reviewed.      Significant Labs:   CMP   Recent Labs   Lab 10/18/19  2151   *  128*   K 4.0  4.0   CL 89*  89*   CO2 27  27   *  205*   BUN 56*  56*   CREATININE 1.3  1.3   CALCIUM 9.6  9.6   PROT 7.2   ALBUMIN 2.9*   BILITOT 0.4   ALKPHOS 128   AST 15   ALT 8*   ANIONGAP 12  12   ESTGFRAFRICA >60.0  >60.0   EGFRNONAA 57.7*  57.7*

## 2019-10-19 NOTE — ASSESSMENT & PLAN NOTE
- Acute exacerbation of heart failure could be from arrhythmia in the setting of having multiple V tach episodes, or from non-compliance with medication or diet restriction, less likely to be in ischemia in the absence of chest pain  - Prior TTE showed EF 15-20%with diastolic dysfunction.  - NYHA functional classification III, ACCF/AHA Stage of Heart Failure is C.  - Continue on GDMT with Coreg at lower dose and unable to have ACEi (allergy); will consider Hydralazine and nitrite when stable   - Grossly volume overloaded with CXR congestion, continue on IV lasix with monitor on UOP

## 2019-10-19 NOTE — HPI
Mr. Sarah is a 63 y.o. Male with ischemic CMP EF 15-20%; CAD CABG 3/2008 (Radial-OM, LIMA to LAD and TOD to dRCA) AICD for HFrEF. He presented to [facility] on 10/16/2019 with multiple AICD shocks. He noticed the shock around 1030 PM the night of admission. During ED visit, he had reported episode of sustained monomorphic ventricular tachycardia 160 bpm. Per report he had multiple AICD shock for his VT storm, and he started on Amiodarone and Lidocaine infusion. Subsequently he VTac stabilized and he was switched from Lidocaine to mexiletine 150 mg TID. He underwent LHC and showed patent Radial-OM; LIMA-LAD; TOD to distal right disease to mLCX with collaterals to RCA. 90% stenosis to mLCX. Ventricualgraphy showed EF 10-15%/ No intervention. He also underwent RHC with reported normal results. On 10/17/2019; he developed recurrent episodes wide complex tachycardias mostly consistent with Ventricular tachycardia; and continue on Amiodarone and Lidocaine infusion. ICD re-programmed to detect slower VT rate of 120 given slow VT with a rate of 120-150 with variable morphologies. On arrival to [facility], he was hemodynamically stable and oxygen saturation on nasal cannula 2-4 L with 95%.

## 2019-10-19 NOTE — PLAN OF CARE
CMICU DAILY GOALS       A: Awake    RASS: Goal - RASS Goal: 0-->alert and calm  Actual - RASS (Lema Agitation-Sedation Scale): 0-->alert and calm   Restraint necessity:    B: Breath   SBT: NA   C: Coordinate A & B, analgesics/sedatives   Pain: managed    SAT: NA  D: Delirium   CAM-ICU: Overall CAM-ICU: Negative  E: Early Mobility   MOVE Screen: PASS    Activity: Activity Management: bedrest maintained per order  FAS: Feeding/Nutrition   Diet order: Diet/Nutrition Received: NPO,   Fluid restriction:    T: Thrombus   DVT prophylaxis: VTE Required Core Measure: (SCDs) Sequential compression device initiated/maintained, Pharmacological prophylaxis initiated/maintained  H: HOB Elevation   Head of Bed (HOB): HOB at 30 degrees  U: Ulcer Prophylaxis   GI: no  G: Glucose control   managed Glycemic Management: blood glucose monitoring  S: Skin   Bundle compliance: yes   Bathing/Skin Care: incontinence care Date: 10/19/2019  B: Bowel Function   no issues   I: Indwelling Catheters   Samson necessity: [REMOVED]      Urethral Catheter 10/17/19-Reason for Continuing Urinary Catheterization: Critically ill in ICU requiring intensive monitoring   CVC necessity: No   IPAD offered: Not appropriate  D: De-escalation Antibx   N/a   Plan for the day   Amiodarone, lidocaine gtt. Plan is for ablation.    Family/Goals of care/Code Status   Code Status: Full Code     No acute events throughout day, VS and assessment per flow sheet, patient progressing towards goals as tolerated, plan of care reviewed with Lázaro Sarah and family, all concerns addressed, will continue to monitor.    [place]

## 2019-10-19 NOTE — H&P
[facility]  Cardiology  History and Physical     Patient Name: Lázaro Sarah  MRN: [id number]  Admission Date: 10/18/2019  Code Status: Full Code   Attending Provider: Rea Davila MD   Primary Care Physician: Cameron Arteaga DO  Principal Problem:Sustained ventricular tachycardia    Patient information was obtained from patient, past medical records and ER records.     Subjective:     Chief Complaint:  V tachy and multiple shocks     HPI:  Mr. Sarah is a 63 y.o. Male with ischemic CMP EF 15-20%; CAD CABG 3/2008 (Radial-OM, LIMA to LAD and TOD to dRCA) AICD for HFrEF. He presented to [facility] on 10/16/2019 with multiple AICD shocks. He noticed the shock around 1030 PM the night of admission. During ED visit, he had reported episode of sustained monomorphic ventricular tachycardia 160 bpm. Per report he had multiple AICD shock for his VT storm, and he started on Amiodarone and Lidocaine infusion. Subsequently he VTac stabilized and he was switched from Lidocaine to mexiletine 150 mg TID. He underwent LHC and showed patent Radial-OM; LIMA-LAD; TOD to distal right disease to mLCX with collaterals to RCA. 90% stenosis to mLCX. Ventricualgraphy showed EF 10-15%/ No intervention. He also underwent RHC with reported normal results. On 10/17/2019; he developed recurrent episodes wide complex tachycardias mostly consistent with Ventricular tachycardia; and continue on Amiodarone and Lidocaine infusion. ICD re-programmed to detect slower VT rate of 120 given slow VT with a rate of 120-150 with variable morphologies. On arrival to [facility] CCU, he was hemodynamically stable and oxygen saturation on nasal cannula 2-4 L with 95%.    Past Medical History:   Diagnosis Date    Acute on chronic systolic heart failure 10/16/2019    Cancer     Skin Cancer     CHF (congestive heart failure)     Chronic rhinitis 10/4/2016    Depression 5/25/2016    Diabetes mellitus     Encounter for  "blood transfusion     during pt open heart surgery 1998    History of hypertension 10/4/2016    Hypertension     Implantable cardioverter-defibrillator (ICD) generator end of life 2018    Leukocytosis 10/16/2019       Past Surgical History:   Procedure Laterality Date    ABDOMINAL SURGERY      hernia repair    CARDIAC SURGERY      open heart    EYE SURGERY      HERNIA REPAIR      SKIN BIOPSY         Review of patient's allergies indicates:   Allergen Reactions    Ace inhibitors Swelling    Adhesive Hives    Novolin 70/30 (semi-synthetic) Hives    Codeine Nausea And Vomiting    Statins-hmg-coa reductase inhibitors Swelling    Bupropion hcl Rash       No current facility-administered medications on file prior to encounter.      No current outpatient medications on file prior to encounter.     Family History       None          Tobacco Use    Smoking status: Former Smoker     Types: Cigarettes     Last attempt to quit: 3/18/2006     Years since quittin.5   Substance and Sexual Activity    Alcohol use: Yes     Comment: per pt "once in a while"    Drug use: Yes     Comment: per pt "Carri on Percocet 2 yrs ago in July"    Sexual activity: Not Currently     Review of Systems   Constitution: Positive for decreased appetite and malaise/fatigue. Negative for chills and night sweats.   HENT: Negative for congestion.    Cardiovascular: Negative for chest pain, irregular heartbeat and leg swelling.   Respiratory: Negative for cough and shortness of breath.    Endocrine: Negative for cold intolerance and heat intolerance.   Skin: Negative for rash.   Musculoskeletal: Negative for arthritis and back pain.   Gastrointestinal: Negative for abdominal pain, constipation and diarrhea.   Genitourinary: Negative for dysuria and hematuria.   Neurological: Negative for dizziness and headaches.   Psychiatric/Behavioral: Negative for altered mental status.     Objective:     Vital Signs (Most Recent):  Pulse: 60 " (10/18/19 2300)  Resp: (!) 23 (10/18/19 2300)  SpO2: (!) 94 % (10/18/19 2300) Vital Signs (24h Range):  Pulse:  [] 60  Resp:  [13-32] 23  SpO2:  [94 %-98 %] 94 %  BP: ()/(60-65) 95/61     Weight: 121.4 kg (267 lb 10.2 oz)  There is no height or weight on file to calculate BMI.    SpO2: (!) 94 %  O2 Device (Oxygen Therapy): nasal cannula w/ humidification    No intake or output data in the 24 hours ending 10/18/19 2338    Lines/Drains/Airways       Peripheral Intravenous Line                   Peripheral IV - Single Lumen 10/17/19 18 G Anterior;Left Upper Arm 1 day         Peripheral IV - Single Lumen 10/17/19 18 G Anterior;Right Upper Arm 1 day                    Physical Exam   Constitutional: He is oriented to person, place, and time. He appears well-nourished. No distress.   HENT:   Head: Normocephalic.   Eyes: Pupils are equal, round, and reactive to light.   Neck: No JVD present. No thyromegaly present.   Cardiovascular: Normal rate and regular rhythm. Exam reveals no friction rub.   No murmur heard.  Pulmonary/Chest: Effort normal. No respiratory distress. He has no wheezes.   Abdominal: Soft. He exhibits no distension. There is no tenderness.   Musculoskeletal: He exhibits no edema.   Neurological: He is alert and oriented to person, place, and time.   Skin:        ICD [employer]    Vitals reviewed.      Significant Labs:   CMP   Recent Labs   Lab 10/18/19  2151   *  128*   K 4.0  4.0   CL 89*  89*   CO2 27  27   *  205*   BUN 56*  56*   CREATININE 1.3  1.3   CALCIUM 9.6  9.6   PROT 7.2   ALBUMIN 2.9*   BILITOT 0.4   ALKPHOS 128   AST 15   ALT 8*   ANIONGAP 12  12   ESTGFRAFRICA >60.0  >60.0   EGFRNONAA 57.7*  57.7*     Assessment and Plan:     * Sustained ventricular tachycardia  - Unable to obtain the events from the device interrogating (was cleared from prior interrogating)  - DDx TdP in the setting of having multiple medication could affect his QTc (anti psychotic, and  SSRI); ischemic etiology; although non obstructive but diseases mLCX   - Continue on Amiodarone and Lidocaine current with repeated lab level of Lidocain    Acute on chronic systolic heart failure  - Acute exacerbation of heart failure could be from arrhythmia in the setting of having multiple V tach episodes, or from non-compliance with medication or diet restriction, less likely to be in ischemia in the absence of chest pain  - Prior TTE showed EF 15-20%with diastolic dysfunction.  - NYHA functional classification III, ACCF/AHA Stage of Heart Failure is C.  - Continue on GDMT with Coreg at lower dose and unable to have ACEi (allergy); will consider Hydralazine and nitrite when stable   - Grossly volume overloaded with CXR congestion, continue on IV lasix with monitor on UOP     Acute hypoxemic respiratory failure  - Low concern for infection in the setting of no CXR findings suggestive of lobar consolidation, UA clean and no leukocytosis  - Stable currently on supplementation oxygen with goal of O2 sat > 90%   - Known to have MEGAN and using CPAP QHS - Continue     MEGAN (obstructive sleep apnea)  - CPAP QHS PRN     CAD (coronary artery disease)  - Recent LHC showed patent Radial-OM; LIMA-LAD; TOD to distal right disease to mLCX with collaterals to RCA. No intervention.   - Continue on ASA and repeat Lipid panel     Essential hypertension  - Continue on Coreg at lower dose and tentative to increase when stable     Diabetes mellitus with neurological manifestations  - Taking home insulin, will resume long and short acting insulin at lower doses     VTE Risk Mitigation (From admission, onward)           Ordered     IP VTE LOW RISK PATIENT  Once      10/18/19 2135     Place sequential compression device  Until discontinued      10/18/19 2135                    Ced Mittal MD  Cardiology   [facility]

## 2019-10-19 NOTE — ASSESSMENT & PLAN NOTE
- Unable to obtain the events from the device interrogating (was cleared from prior interrogating)  - DDx TdP in the setting of having multiple medication could affect his QTc (anti psychotic, and SSRI); ischemic etiology; although non obstructive but diseases mLCX   - Continue on Amiodarone and Lidocaine current with repeated lab level of Lidocaine  - Plan discussed with primary and EP teams.

## 2019-10-19 NOTE — ASSESSMENT & PLAN NOTE
- Recent LHC showed patent Radial-OM; LIMA-LAD; TOD to distal right disease to mLCX with collaterals to RCA. No intervention.   - Continue on ASA and repeat Lipid panel

## 2019-10-19 NOTE — ASSESSMENT & PLAN NOTE
- Low concern for infection in the setting of no CXR findings suggestive of lobar consolidation, UA clean and no leukocytosis  - Stable currently on supplementation oxygen with goal of O2 sat > 90%   - Known to have MEGAN and using CPAP QHS - Continue

## 2019-10-19 NOTE — PLAN OF CARE
CMICU DAILY GOALS       A: Awake    RASS: Goal - RASS Goal: 0-->alert and calm  Actual - RASS (Lema Agitation-Sedation Scale): 0-->alert and calm   Restraint necessity:    B: Breath   SBT: NA   C: Coordinate A & B, analgesics/sedatives   Pain: managed    SAT: NA  D: Delirium   CAM-ICU: Overall CAM-ICU: Negative  E: Early Mobility   MOVE Screen: Fail   Activity: Activity Management: activity adjusted per tolerance  FAS: Feeding/Nutrition   Diet order: Diet/Nutrition Received: NPO,   Fluid restriction:    T: Thrombus   DVT prophylaxis:    H: HOB Elevation   Head of Bed (HOB): HOB at 30-45 degrees  U: Ulcer Prophylaxis   GI:    G: Glucose control   managed Glycemic Management: blood glucose monitoring  S: Skin   Bundle compliance: yes   Bathing/Skin Care: bath, chlorhexidine, bath, complete, dressed/undressed, linen changed Date: 10/18 PM shift  B: Bowel Function   LBM 10/17    I: Indwelling Catheters   Samson necessity:      Urethral Catheter 10/17/19-Reason for Continuing Urinary Catheterization: Critically ill in ICU requiring intensive monitoring   CVC necessity: No   IPAD offered: No  D: De-escalation Antibx   Meds per MAR.    Plan for the day   Monitor for V Tach. Plan for possible ablation.   Family/Goals of care/Code Status   Code Status: Full Code     No acute events throughout day, VS and assessment per flow sheet, patient progressing towards goals as tolerated, plan of care reviewed with Lázaro Sarah, all concerns addressed, will continue to monitor.    Yue Henriquez

## 2019-10-19 NOTE — HPI
Mr. Sarah is a 63 y.o. Male with ischemic CMP EF 15-20%; CAD CABG 3/2008 (Radial-OM, LIMA to LAD and TOD to dRCA) AICD for HFrEF. He presented to [facility] on 10/16/2019 with multiple AICD shocks. He noticed the shock around 1030 PM the night of admission. During ED visit, he had reported episode of sustained monomorphic ventricular tachycardia 160 bpm. Per report he had multiple AICD shock for his VT storm, and he started on Amiodarone and Lidocaine infusion. Subsequently he VTac stabilized and he was switched from Lidocaine to mexiletine 150 mg TID. He underwent LHC and showed patent Radial-OM; LIMA-LAD; TOD to distal right disease to mLCX with collaterals to RCA. 90% stenosis to mLCX. Ventricualgraphy showed EF 10-15%/ No intervention. He also underwent RHC with reported normal results. On 10/17/2019; he developed recurrent episodes wide complex tachycardias mostly consistent with Ventricular tachycardia; and continue on Amiodarone and Lidocaine infusion. ICD re-programmed to detect slower VT rate of 120 given slow VT with a rate of 120-150 with variable morphologies. On arrival to [facility] [place] CCU, he was hemodynamically stable and oxygen saturation on nasal cannula 2-4 L with 95%.

## 2019-10-19 NOTE — ASSESSMENT & PLAN NOTE
- Unable to obtain the events from the device interrogating (was cleared from prior interrogating)  - DDx TdP in the setting of having multiple medication could affect his QTc (anti psychotic, and SSRI); ischemic etiology; although non obstructive but diseases mLCX   - Continue on Amiodarone and Lidocaine current with repeated lab level of Lidocain

## 2019-10-20 LAB
ALBUMIN SERPL BCP-MCNC: 3 G/DL (ref 3.5–5.2)
ALP SERPL-CCNC: 130 U/L (ref 55–135)
ALT SERPL W/O P-5'-P-CCNC: 10 U/L (ref 10–44)
ANION GAP SERPL CALC-SCNC: 15 MMOL/L (ref 8–16)
AST SERPL-CCNC: 12 U/L (ref 10–40)
BACTERIA #/AREA URNS AUTO: NORMAL /HPF
BASOPHILS # BLD AUTO: 0.02 K/UL (ref 0–0.2)
BASOPHILS NFR BLD: 0.1 % (ref 0–1.9)
BILIRUB SERPL-MCNC: 0.3 MG/DL (ref 0.1–1)
BILIRUB UR QL STRIP: NEGATIVE
BUN SERPL-MCNC: 56 MG/DL (ref 8–23)
CALCIUM SERPL-MCNC: 9.6 MG/DL (ref 8.7–10.5)
CHLORIDE SERPL-SCNC: 87 MMOL/L (ref 95–110)
CLARITY UR REFRACT.AUTO: ABNORMAL
CO2 SERPL-SCNC: 27 MMOL/L (ref 23–29)
COLOR UR AUTO: YELLOW
CREAT SERPL-MCNC: 1.6 MG/DL (ref 0.5–1.4)
DIFFERENTIAL METHOD: ABNORMAL
EOSINOPHIL # BLD AUTO: 0.1 K/UL (ref 0–0.5)
EOSINOPHIL NFR BLD: 0.9 % (ref 0–8)
ERYTHROCYTE [DISTWIDTH] IN BLOOD BY AUTOMATED COUNT: 12.1 % (ref 11.5–14.5)
EST. GFR  (AFRICAN AMERICAN): 51.9 ML/MIN/1.73 M^2
EST. GFR  (NON AFRICAN AMERICAN): 44.9 ML/MIN/1.73 M^2
GLUCOSE SERPL-MCNC: 274 MG/DL (ref 70–110)
GLUCOSE UR QL STRIP: ABNORMAL
HCT VFR BLD AUTO: 39.1 % (ref 40–54)
HGB BLD-MCNC: 13.3 G/DL (ref 14–18)
HGB UR QL STRIP: ABNORMAL
IMM GRANULOCYTES # BLD AUTO: 0.11 K/UL (ref 0–0.04)
IMM GRANULOCYTES NFR BLD AUTO: 0.8 % (ref 0–0.5)
KETONES UR QL STRIP: NEGATIVE
LEUKOCYTE ESTERASE UR QL STRIP: ABNORMAL
LYMPHOCYTES # BLD AUTO: 2.2 K/UL (ref 1–4.8)
LYMPHOCYTES NFR BLD: 15.2 % (ref 18–48)
MAGNESIUM SERPL-MCNC: 2.4 MG/DL (ref 1.6–2.6)
MAGNESIUM SERPL-MCNC: 4.4 MG/DL (ref 1.6–2.6)
MCH RBC QN AUTO: 28.9 PG (ref 27–31)
MCHC RBC AUTO-ENTMCNC: 34 G/DL (ref 32–36)
MCV RBC AUTO: 85 FL (ref 82–98)
MICROSCOPIC COMMENT: NORMAL
MONOCYTES # BLD AUTO: 2 K/UL (ref 0.3–1)
MONOCYTES NFR BLD: 13.6 % (ref 4–15)
NEUTROPHILS # BLD AUTO: 10 K/UL (ref 1.8–7.7)
NEUTROPHILS NFR BLD: 69.4 % (ref 38–73)
NITRITE UR QL STRIP: NEGATIVE
NRBC BLD-RTO: 0 /100 WBC
PH UR STRIP: 5 [PH] (ref 5–8)
PHOSPHATE SERPL-MCNC: 3 MG/DL (ref 2.7–4.5)
PLATELET # BLD AUTO: 457 K/UL (ref 150–350)
PMV BLD AUTO: 10.6 FL (ref 9.2–12.9)
POCT GLUCOSE: 227 MG/DL (ref 70–110)
POCT GLUCOSE: 252 MG/DL (ref 70–110)
POCT GLUCOSE: 301 MG/DL (ref 70–110)
POCT GLUCOSE: 312 MG/DL (ref 70–110)
POTASSIUM SERPL-SCNC: 3.6 MMOL/L (ref 3.5–5.1)
POTASSIUM SERPL-SCNC: 3.9 MMOL/L (ref 3.5–5.1)
POTASSIUM SERPL-SCNC: 4.2 MMOL/L (ref 3.5–5.1)
PROT SERPL-MCNC: 7.3 G/DL (ref 6–8.4)
PROT UR QL STRIP: NEGATIVE
RBC # BLD AUTO: 4.6 M/UL (ref 4.6–6.2)
RBC #/AREA URNS AUTO: 1 /HPF (ref 0–4)
SODIUM SERPL-SCNC: 129 MMOL/L (ref 136–145)
SP GR UR STRIP: 1.01 (ref 1–1.03)
SQUAMOUS #/AREA URNS AUTO: 0 /HPF
URATE CRY UR QL COMP ASSIST: NORMAL
URN SPEC COLLECT METH UR: ABNORMAL
WBC # BLD AUTO: 14.37 K/UL (ref 3.9–12.7)
WBC #/AREA URNS AUTO: 1 /HPF (ref 0–5)

## 2019-10-20 PROCEDURE — 99233 PR SUBSEQUENT HOSPITAL CARE,LEVL III: ICD-10-PCS | Mod: ,,, | Performed by: INTERNAL MEDICINE

## 2019-10-20 PROCEDURE — 63600175 PHARM REV CODE 636 W HCPCS: Performed by: STUDENT IN AN ORGANIZED HEALTH CARE EDUCATION/TRAINING PROGRAM

## 2019-10-20 PROCEDURE — 81001 URINALYSIS AUTO W/SCOPE: CPT

## 2019-10-20 PROCEDURE — 99231 SBSQ HOSP IP/OBS SF/LOW 25: CPT | Mod: GC,,, | Performed by: INTERNAL MEDICINE

## 2019-10-20 PROCEDURE — 20000000 HC ICU ROOM

## 2019-10-20 PROCEDURE — 99231 PR SUBSEQUENT HOSPITAL CARE,LEVL I: ICD-10-PCS | Mod: GC,,, | Performed by: INTERNAL MEDICINE

## 2019-10-20 PROCEDURE — 84100 ASSAY OF PHOSPHORUS: CPT

## 2019-10-20 PROCEDURE — 99233 SBSQ HOSP IP/OBS HIGH 50: CPT | Mod: ,,, | Performed by: INTERNAL MEDICINE

## 2019-10-20 PROCEDURE — 80053 COMPREHEN METABOLIC PANEL: CPT

## 2019-10-20 PROCEDURE — 85025 COMPLETE CBC W/AUTO DIFF WBC: CPT

## 2019-10-20 PROCEDURE — 27100171 HC OXYGEN HIGH FLOW UP TO 24 HOURS

## 2019-10-20 PROCEDURE — 83735 ASSAY OF MAGNESIUM: CPT

## 2019-10-20 PROCEDURE — 27000221 HC OXYGEN, UP TO 24 HOURS

## 2019-10-20 PROCEDURE — 25000003 PHARM REV CODE 250: Performed by: STUDENT IN AN ORGANIZED HEALTH CARE EDUCATION/TRAINING PROGRAM

## 2019-10-20 PROCEDURE — 94761 N-INVAS EAR/PLS OXIMETRY MLT: CPT

## 2019-10-20 PROCEDURE — 94660 CPAP INITIATION&MGMT: CPT

## 2019-10-20 PROCEDURE — 99900035 HC TECH TIME PER 15 MIN (STAT)

## 2019-10-20 PROCEDURE — 84132 ASSAY OF SERUM POTASSIUM: CPT

## 2019-10-20 RX ORDER — POTASSIUM CHLORIDE 20 MEQ/1
60 TABLET, EXTENDED RELEASE ORAL ONCE
Status: COMPLETED | OUTPATIENT
Start: 2019-10-20 | End: 2019-10-20

## 2019-10-20 RX ORDER — ALPRAZOLAM 0.5 MG/1
2 TABLET ORAL 4 TIMES DAILY PRN
Status: DISCONTINUED | OUTPATIENT
Start: 2019-10-20 | End: 2019-10-23

## 2019-10-20 RX ORDER — ALPRAZOLAM 0.5 MG/1
2 TABLET ORAL ONCE
Status: COMPLETED | OUTPATIENT
Start: 2019-10-20 | End: 2019-10-20

## 2019-10-20 RX ORDER — ALPRAZOLAM 0.5 MG/1
1 TABLET ORAL EVERY 12 HOURS PRN
Status: DISCONTINUED | OUTPATIENT
Start: 2019-10-20 | End: 2019-10-23

## 2019-10-20 RX ADMIN — CARVEDILOL 12.5 MG: 12.5 TABLET, FILM COATED ORAL at 09:10

## 2019-10-20 RX ADMIN — TIMOLOL MALEATE 1 DROP: 5 SOLUTION OPHTHALMIC at 09:10

## 2019-10-20 RX ADMIN — AMIODARONE HYDROCHLORIDE 1 MG/MIN: 1.8 INJECTION, SOLUTION INTRAVENOUS at 05:10

## 2019-10-20 RX ADMIN — HYDRALAZINE HYDROCHLORIDE AND ISOSORBIDE DINITRATE 1 TABLET: 37.5; 2 TABLET, FILM COATED ORAL at 09:10

## 2019-10-20 RX ADMIN — ALPRAZOLAM 2 MG: 0.5 TABLET ORAL at 07:10

## 2019-10-20 RX ADMIN — INSULIN ASPART 8 UNITS: 100 INJECTION, SOLUTION INTRAVENOUS; SUBCUTANEOUS at 12:10

## 2019-10-20 RX ADMIN — ASPIRIN 81 MG: 81 TABLET, COATED ORAL at 09:10

## 2019-10-20 RX ADMIN — AMIODARONE HYDROCHLORIDE 1 MG/MIN: 1.8 INJECTION, SOLUTION INTRAVENOUS at 09:10

## 2019-10-20 RX ADMIN — POTASSIUM CHLORIDE 60 MEQ: 1500 TABLET, EXTENDED RELEASE ORAL at 05:10

## 2019-10-20 RX ADMIN — INSULIN ASPART 8 UNITS: 100 INJECTION, SOLUTION INTRAVENOUS; SUBCUTANEOUS at 05:10

## 2019-10-20 RX ADMIN — GABAPENTIN 300 MG: 300 CAPSULE ORAL at 09:10

## 2019-10-20 RX ADMIN — ALPRAZOLAM 1 MG: 0.5 TABLET ORAL at 10:10

## 2019-10-20 RX ADMIN — LIDOCAINE HYDROCHLORIDE 1 MG/MIN: 8 INJECTION, SOLUTION INTRAVENOUS at 10:10

## 2019-10-20 RX ADMIN — DORZOLAMIDE HYDROCHLORIDE 1 DROP: 20 SOLUTION/ DROPS OPHTHALMIC at 09:10

## 2019-10-20 RX ADMIN — INSULIN ASPART 3 UNITS: 100 INJECTION, SOLUTION INTRAVENOUS; SUBCUTANEOUS at 05:10

## 2019-10-20 RX ADMIN — INSULIN ASPART 2 UNITS: 100 INJECTION, SOLUTION INTRAVENOUS; SUBCUTANEOUS at 09:10

## 2019-10-20 RX ADMIN — INSULIN ASPART 6 UNITS: 100 INJECTION, SOLUTION INTRAVENOUS; SUBCUTANEOUS at 07:10

## 2019-10-20 RX ADMIN — INSULIN ASPART 3 UNITS: 100 INJECTION, SOLUTION INTRAVENOUS; SUBCUTANEOUS at 12:10

## 2019-10-20 RX ADMIN — INSULIN ASPART 3 UNITS: 100 INJECTION, SOLUTION INTRAVENOUS; SUBCUTANEOUS at 07:10

## 2019-10-20 RX ADMIN — AMIODARONE HYDROCHLORIDE 0.5 MG/MIN: 1.8 INJECTION, SOLUTION INTRAVENOUS at 06:10

## 2019-10-20 RX ADMIN — HYDRALAZINE HYDROCHLORIDE AND ISOSORBIDE DINITRATE 1 TABLET: 37.5; 2 TABLET, FILM COATED ORAL at 02:10

## 2019-10-20 RX ADMIN — ALPRAZOLAM 2 MG: 0.5 TABLET ORAL at 12:10

## 2019-10-20 NOTE — PROGRESS NOTES
Pt. On sustained monomorphic vtach, vss, pt. Asymtomatic. Dr. Andrae Kraft at bedside to cardiovert pt. Versed IV given per MD orders. Pt. Shocked once, converted to paced rhythm, will continue to monitor pt.

## 2019-10-20 NOTE — ASSESSMENT & PLAN NOTE
Lázaro Sarah is a 63 y.o. M with CAD s/p CABG, ICM EF 15-20% with recurrent, drug refractory MMVT who presents for further evaluation.   - has known history of CABG  - TTE w/contrast 10/19/19: EF 23%, no apical thrombus noted, akinesis of apex and septal wall, trivial posterior pericardial effusion noted.   - pt has had various morphologies of MMVT suggestive numerous areas of foci for scar-related VT. Some of these include (1) RBBB positive concordance, right/superior axis TCL ~400, (2) LBBB, negative concordance, left/superior axis, TCL ~ 420 (3) RBBB positive concordance   - currently on lidocaine 3 mg/min and amiodarone 1 mg/min. Recommend decreasing dose to lidocoine 1 mg/min and amiodarone 0.5 mg/min. Lidocaine level is pending.  - [employer]abe ICD reprogrammed VT1 zone to detect tachyarrhythmias at 120 bpm.   - plan for VT ablation tomorrow  - would continue avoiding QT prolonging meds  - goal K > 4, Mg > 2  - NPO at MN  - continue tele monitoring

## 2019-10-20 NOTE — PROGRESS NOTES
[facility]  Cardiology  Progress Note    Patient Name: Lázaro Sarah  MRN: [id number]  Admission Date: 10/18/2019  Hospital Length of Stay: 2 days  Code Status: Full Code   Attending Physician: Rea Davila MD   Primary Care Physician: Cameron Arteaga DO  Expected Discharge Date:   Principal Problem:Sustained ventricular tachycardia    Subjective:     Hospital Course:   Patient admitted to CCU on 10/18 with concerns of multiple AICD shocks for his VT storm. Patient had continued amiodarone and lidocaine infusion with plans to undergo ablation on 10/21/19. Hospital course so far complicated by multiple shocks leading to severe patient discomfort. Will try to treat with xanax, consider escalation to precedex if this does not work.     Interval History: No acute events overnight however patient has had 3-4 shocks this morning for VT. Plans to undergo ablation on 10/21, electrolytes within normal limits, will attempt to treat symptomatically.     Review of Systems   Constitution: Positive for decreased appetite and malaise/fatigue. Negative for chills and night sweats.   HENT: Negative for congestion.    Cardiovascular: Positive for chest pain (discomfort). Negative for irregular heartbeat and leg swelling.   Respiratory: Negative for cough and shortness of breath.    Endocrine: Negative for cold intolerance and heat intolerance.   Skin: Negative for rash.   Musculoskeletal: Negative for arthritis and back pain.   Gastrointestinal: Negative for abdominal pain, constipation and diarrhea.   Genitourinary: Negative for dysuria and hematuria.   Neurological: Negative for dizziness and headaches.   Psychiatric/Behavioral: Negative for altered mental status. The patient is nervous/anxious.      Objective:     Vital Signs (Most Recent):  Temp: 98 °F (36.7 °C) (10/20/19 1100)  Pulse: 60 (10/20/19 1300)  Resp: (!) 23 (10/20/19 1300)  BP: 119/77 (10/20/19 1300)  SpO2: 95 % (10/20/19 1300) Vital Signs  (24h Range):  Temp:  [97.6 °F (36.4 °C)-98.6 °F (37 °C)] 98 °F (36.7 °C)  Pulse:  [] 60  Resp:  [15-34] 23  SpO2:  [92 %-100 %] 95 %  BP: (113-168)/() 119/77     Weight: 121.1 kg (267 lb)  Body mass index is 39.43 kg/m².     SpO2: 95 %  O2 Device (Oxygen Therapy): nasal cannula      Intake/Output Summary (Last 24 hours) at 10/20/2019 1348  Last data filed at 10/20/2019 1300  Gross per 24 hour   Intake 3031.54 ml   Output 2945 ml   Net 86.54 ml       Lines/Drains/Airways     Drain                 Urethral Catheter 10/19/19 2030 less than 1 day          Peripheral Intravenous Line                 Peripheral IV - Single Lumen 10/17/19 18 G Anterior;Left Upper Arm 3 days         Peripheral IV - Single Lumen 10/19/19 1730 22 G Anterior;Left Hand less than 1 day                Physical Exam   Constitutional: He is oriented to person, place, and time. He appears well-nourished. No distress.   HENT:   Head: Normocephalic.   Eyes: Pupils are equal, round, and reactive to light.   Neck: No JVD present. No thyromegaly present.   Cardiovascular: Normal rate and regular rhythm. Exam reveals no friction rub.   No murmur heard.  Pulmonary/Chest: Effort normal. No respiratory distress. He has no wheezes.   Abdominal: Soft. He exhibits no distension. There is no tenderness.   Musculoskeletal: He exhibits no edema.   Neurological: He is alert and oriented to person, place, and time.   Skin:        ICD [employer]abe    Vitals reviewed.      Significant Labs: All pertinent lab results from the last 24 hours have been reviewed.    Significant Imaging: Reviewed    Assessment and Plan:     * Sustained ventricular tachycardia  - Unable to obtain the events from the device interrogating (was cleared from prior interrogating)  - DDx TdP in the setting of having multiple medication could affect his QTc (anti psychotic, and SSRI); ischemic etiology; although non obstructive but diseases mLCX   - Continue on Amiodarone and Lidocaine  current with repeated lab level of Lidocaine  - Patient to undergo VT ablation on 10/21      Acute hypoxemic respiratory failure  - Low concern for infection in the setting of no CXR findings suggestive of lobar consolidation- Stable currently on supplementation oxygen with goal of O2 sat > 90%   - Known to have MEGAN and using CPAP QHS - Continue     MEGAN (obstructive sleep apnea)  - CPAP QHS PRN     Leukocytosis  - New leukocytosis of 14 on 10/20  - Likely due to stress reaction from multiple shocks  - Will get UA, chest x-ray without lobar consolidation, afebrile    CAD (coronary artery disease)  - Recent LHC showed patent Radial-OM; LIMA-LAD; TOD to distal right disease to mLCX with collaterals to RCA. No intervention.   - Continue on ASA   - Lipid panel reviewed    Essential hypertension  - Continue on Coreg at lower dose and tentative to increase when stable     Diabetes mellitus with neurological manifestations  - Taking home insulin, will resume long and short acting insulin at lower doses     Acute on chronic systolic heart failure  - Acute exacerbation of heart failure could be from arrhythmia in the setting of having multiple V tach episodes, or from non-compliance with medication or diet restriction, less likely to be in ischemia in the absence of chest pain  - Prior TTE showed EF 15-20%with diastolic dysfunction.  - NYHA functional classification III, ACCF/AHA Stage of Heart Failure is C.  - Continue on GDMT with Coreg at lower dose and unable to have ACEi (allergy); will consider Hydralazine and nitrite when stable           VTE Risk Mitigation (From admission, onward)         Ordered     IP VTE LOW RISK PATIENT  Once      10/18/19 2135     Place sequential compression device  Until discontinued      10/18/19 2135                Neil Partida MD  Cardiology  [facility]

## 2019-10-20 NOTE — ASSESSMENT & PLAN NOTE
- New leukocytosis of 14 on 10/20  - Likely due to stress reaction from multiple shocks  - Will get UA, chest x-ray without lobar consolidation, afebrile

## 2019-10-20 NOTE — PROGRESS NOTES
[facility]  Cardiac Electrophysiology  Progress Note    Admission Date: 10/18/2019  Code Status: Full Code   Attending Physician: Rea Davila MD   Expected Discharge Date:   Principal Problem:Sustained ventricular tachycardia    Subjective:     Interval History: Yesterday pt had sustained episode of VT 18:22-18:25 for which he received external cardioversion. His device unsuccessfully was able to recognize the slower rate of 130 bpm as VT and provide shock therapy.    Review of Systems   Constitution: Positive for decreased appetite and malaise/fatigue. Negative for chills and night sweats.   HENT: Negative for congestion.    Cardiovascular: Negative for chest pain, irregular heartbeat and leg swelling.   Respiratory: Negative for cough and shortness of breath.    Endocrine: Negative for cold intolerance and heat intolerance.   Skin: Negative for rash.   Musculoskeletal: Negative for arthritis and back pain.   Gastrointestinal: Negative for abdominal pain, constipation and diarrhea.   Genitourinary: Negative for dysuria and hematuria.   Neurological: Negative for dizziness, headaches and light-headedness.   Psychiatric/Behavioral: Negative for altered mental status. The patient is nervous/anxious.      Objective:     Vital Signs (Most Recent):  Temp: 97.6 °F (36.4 °C) (10/20/19 0701)  Pulse: 90 (10/20/19 0943)  Resp: (!) 24 (10/20/19 0900)  BP: 127/87 (10/20/19 0943)  SpO2: 95 % (10/20/19 0900) Vital Signs (24h Range):  Temp:  [97.6 °F (36.4 °C)-98.6 °F (37 °C)] 97.6 °F (36.4 °C)  Pulse:  [] 90  Resp:  [16-34] 24  SpO2:  [91 %-100 %] 95 %  BP: (101-168)/() 127/87     Weight: 121.1 kg (267 lb)  Body mass index is 39.43 kg/m².     SpO2: 95 %  O2 Device (Oxygen Therapy): nasal cannula    Physical Exam   Constitutional: He is oriented to person, place, and time. He appears well-nourished. No distress.   HENT:   Head: Normocephalic.   Eyes: Pupils are equal, round, and reactive to  light.   Neck: No JVD present. No thyromegaly present.   Cardiovascular: Normal rate and regular rhythm. Exam reveals no friction rub.   No murmur heard.  Pulmonary/Chest: Effort normal. No respiratory distress. He has no wheezes.   Abdominal: Soft. He exhibits no distension. There is no tenderness.   Musculoskeletal: He exhibits no edema.   Neurological: He is alert and oriented to person, place, and time.    ICD [employer]abe    Vitals reviewed.    Significant Labs:   CMP:   Recent Labs   Lab 10/18/19  2151 10/19/19  0252 10/19/19  1718 10/19/19  2349 10/20/19  0313   *  128* 131* 130*  --  129*   K 4.0  4.0 4.0 3.7 3.9 3.6   CL 89*  89* 91* 88*  --  87*   CO2 27  27 26 26  --  27   *  205* 248* 293*  --  274*   BUN 56*  56* 56* 57*  --  56*   CREATININE 1.3  1.3 1.4 1.6*  --  1.6*   CALCIUM 9.6  9.6 10.0 9.9  --  9.6   PROT 7.2 7.0  --   --  7.3   ALBUMIN 2.9* 2.8*  --   --  3.0*   BILITOT 0.4 0.3  --   --  0.3   ALKPHOS 128 124  --   --  130   AST 15 14  --   --  12   ALT 8* 9*  --   --  10   ANIONGAP 12  12 14 16  --  15   ESTGFRAFRICA >60.0  >60.0 >60.0 51.9*  --  51.9*   EGFRNONAA 57.7*  57.7* 52.7* 44.9*  --  44.9*    and CBC:   Recent Labs   Lab 10/18/19  2321 10/19/19  0252 10/20/19  0313   WBC 11.65 11.57 14.37*   HGB 13.0* 12.6* 13.3*   HCT 40.8 39.0* 39.1*   * 399* 457*       Significant Imaging: n/a    Assessment and Plan:     * Sustained ventricular tachycardia  Lázaro Sarah is a 63 y.o. M with CAD s/p CABG, ICM EF 15-20% with recurrent, drug refractory MMVT who presents for further evaluation.   - has known history of CABG  - TTE w/contrast 10/19/19: EF 23%, no apical thrombus noted, akinesis of apex and septal wall, trivial posterior pericardial effusion noted.   - pt has had various morphologies of MMVT suggestive numerous areas of foci for scar-related VT. Some of these include (1) RBBB positive concordance, right/superior axis TCL ~400, (2) LBBB, negative concordance,  left/superior axis, TCL ~ 420 (3) RBBB positive  concordance right/inferior axis ~ 470 (4) RBBB, changes axis at V3 small rQS in V1 suggestive of epicardiac foci  - currently on lidocaine 3 mg/min and amiodarone 1 mg/min. Recommend decreasing dose to lidocoine 1 mg/min and amiodarone 0.5 mg/min. Lidocaine level is pending.  - [employer]abe ICD reprogrammed VT1 zone to detect tachyarrhythmias at 120 bpm.   - plan for VT ablation tomorrow  - would continue avoiding QT prolonging meds  - goal K > 4, Mg > 2  - NPO at MN  - continue tele monitoring     Thank you for the consult. We will continue to follow. Please don't hesitate to call with questions.     Patient seen and plan of care discussed with Dr. Sun.    Taylor Hernandez MD  Cardiac Electrophysiology  [facility]

## 2019-10-20 NOTE — PLAN OF CARE
CMICU DAILY GOALS       A: Awake    RASS: Goal - RASS Goal: 0-->alert and calm  Actual - RASS (Lema Agitation-Sedation Scale): 0-->alert and calm   Restraint necessity:    B: Breath   SBT: Not intubated   C: Coordinate A & B, analgesics/sedatives   Pain: managed    SAT: Not intubated  D: Delirium   CAM-ICU: Overall CAM-ICU: Negative  E: Early Mobility   MOVE Screen: Pass   Activity: Activity Management: activity adjusted per tolerance  FAS: Feeding/Nutrition   Diet order: Diet/Nutrition Received: consistent carb/diabetic diet, 2 gram sodium,   Fluid restriction:    T: Thrombus   DVT prophylaxis: VTE Required Core Measure: (SCDs) Sequential compression device initiated/maintained  H: HOB Elevation   Head of Bed (HOB): HOB at 30-45 degrees  U: Ulcer Prophylaxis   GI: yes  G: Glucose control   managed Glycemic Management: blood glucose monitoring  S: Skin   Bundle compliance: yes   Bathing/Skin Care: incontinence care Date: 10/20/19   no issues   I: Indwelling Catheters   Samson necessity:    CVC necessity: No   IPAD offered: No  D: De-escalation Antibx   Yes  Plan for the day  Check and replace lytes  Family/Goals of care/Code Status   Code Status: Full Code     Pt. On sustained Vtach early on shift, cardioverted, paced throughout the night, remain on Lidocaine and Amio gtts, on cpap overnight . Electrolytes replaced this am. Frequent vitals and assessment per flow sheet, plan of care reviewed with Mr. Sarah, questions/concerns addressed, will continue to monitor pt.    Angelica Lanier

## 2019-10-20 NOTE — ASSESSMENT & PLAN NOTE
- Unable to obtain the events from the device interrogating (was cleared from prior interrogating)  - DDx TdP in the setting of having multiple medication could affect his QTc (anti psychotic, and SSRI); ischemic etiology; although non obstructive but diseases mLCX   - Continue on Amiodarone and Lidocaine current with repeated lab level of Lidocaine  - Patient to undergo VT ablation on 10/21

## 2019-10-20 NOTE — ASSESSMENT & PLAN NOTE
- Acute exacerbation of heart failure could be from arrhythmia in the setting of having multiple V tach episodes, or from non-compliance with medication or diet restriction, less likely to be in ischemia in the absence of chest pain  - Prior TTE showed EF 15-20%with diastolic dysfunction.  - NYHA functional classification III, ACCF/AHA Stage of Heart Failure is C.  - Continue on GDMT with Coreg at lower dose and unable to have ACEi (allergy); will consider Hydralazine and nitrite when stable

## 2019-10-20 NOTE — SUBJECTIVE & OBJECTIVE
Interval History: No acute events overnight however patient has had 3-4 shocks this morning for VT. Plans to undergo ablation on 10/21, electrolytes within normal limits, will attempt to treat symptomatically.     Review of Systems   Constitution: Positive for decreased appetite and malaise/fatigue. Negative for chills and night sweats.   HENT: Negative for congestion.    Cardiovascular: Positive for chest pain (discomfort). Negative for irregular heartbeat and leg swelling.   Respiratory: Negative for cough and shortness of breath.    Endocrine: Negative for cold intolerance and heat intolerance.   Skin: Negative for rash.   Musculoskeletal: Negative for arthritis and back pain.   Gastrointestinal: Negative for abdominal pain, constipation and diarrhea.   Genitourinary: Negative for dysuria and hematuria.   Neurological: Negative for dizziness and headaches.   Psychiatric/Behavioral: Negative for altered mental status. The patient is nervous/anxious.      Objective:     Vital Signs (Most Recent):  Temp: 98 °F (36.7 °C) (10/20/19 1100)  Pulse: 60 (10/20/19 1300)  Resp: (!) 23 (10/20/19 1300)  BP: 119/77 (10/20/19 1300)  SpO2: 95 % (10/20/19 1300) Vital Signs (24h Range):  Temp:  [97.6 °F (36.4 °C)-98.6 °F (37 °C)] 98 °F (36.7 °C)  Pulse:  [] 60  Resp:  [15-34] 23  SpO2:  [92 %-100 %] 95 %  BP: (113-168)/() 119/77     Weight: 121.1 kg (267 lb)  Body mass index is 39.43 kg/m².     SpO2: 95 %  O2 Device (Oxygen Therapy): nasal cannula      Intake/Output Summary (Last 24 hours) at 10/20/2019 1348  Last data filed at 10/20/2019 1300  Gross per 24 hour   Intake 3031.54 ml   Output 2945 ml   Net 86.54 ml       Lines/Drains/Airways     Drain                 Urethral Catheter 10/19/19 2030 less than 1 day          Peripheral Intravenous Line                 Peripheral IV - Single Lumen 10/17/19 18 G Anterior;Left Upper Arm 3 days         Peripheral IV - Single Lumen 10/19/19 1730 22 G Anterior;Left Hand less than  1 day                Physical Exam   Constitutional: He is oriented to person, place, and time. He appears well-nourished. No distress.   HENT:   Head: Normocephalic.   Eyes: Pupils are equal, round, and reactive to light.   Neck: No JVD present. No thyromegaly present.   Cardiovascular: Normal rate and regular rhythm. Exam reveals no friction rub.   No murmur heard.  Pulmonary/Chest: Effort normal. No respiratory distress. He has no wheezes.   Abdominal: Soft. He exhibits no distension. There is no tenderness.   Musculoskeletal: He exhibits no edema.   Neurological: He is alert and oriented to person, place, and time.   Skin:        ICD St. Gabe    Vitals reviewed.      Significant Labs: All pertinent lab results from the last 24 hours have been reviewed.    Significant Imaging: Reviewed

## 2019-10-20 NOTE — PLAN OF CARE
CCU Update Note    Called re persistent VT > 2 min, confirmed on 12-lead EKG, SJM device interrogated, confirmed event.Discussed w EP, unable to ATP out of VT, ultimately shocked, and decreased VT zone to 120 bpm. Report printed; patient will be followed by EP in AM. Lytes checked, repleted.      Kobe Lopez MD  PGY-4, Cardiology  Pager [phone]

## 2019-10-20 NOTE — PROGRESS NOTES
Pt. On sustained Vtach, shocked twice per his defibrillator. VSS. Dr. Lopez notified, orders received to send blood  To check K and Mag levels. Will continue to monitor pt.

## 2019-10-20 NOTE — SUBJECTIVE & OBJECTIVE
Interval History: Yesterday pt had sustained episode of VT 18:22-18:25 for which he received external cardioversion. His device unsuccessfully was able to recognize the slower rate of 130 bpm as VT and provide shock therapy.    Review of Systems   Constitution: Positive for decreased appetite and malaise/fatigue. Negative for chills and night sweats.   HENT: Negative for congestion.    Cardiovascular: Negative for chest pain, irregular heartbeat and leg swelling.   Respiratory: Negative for cough and shortness of breath.    Endocrine: Negative for cold intolerance and heat intolerance.   Skin: Negative for rash.   Musculoskeletal: Negative for arthritis and back pain.   Gastrointestinal: Negative for abdominal pain, constipation and diarrhea.   Genitourinary: Negative for dysuria and hematuria.   Neurological: Negative for dizziness, headaches and light-headedness.   Psychiatric/Behavioral: Negative for altered mental status. The patient is nervous/anxious.      Objective:     Vital Signs (Most Recent):  Temp: 97.6 °F (36.4 °C) (10/20/19 0701)  Pulse: 90 (10/20/19 0943)  Resp: (!) 24 (10/20/19 0900)  BP: 127/87 (10/20/19 0943)  SpO2: 95 % (10/20/19 0900) Vital Signs (24h Range):  Temp:  [97.6 °F (36.4 °C)-98.6 °F (37 °C)] 97.6 °F (36.4 °C)  Pulse:  [] 90  Resp:  [16-34] 24  SpO2:  [91 %-100 %] 95 %  BP: (101-168)/() 127/87     Weight: 121.1 kg (267 lb)  Body mass index is 39.43 kg/m².     SpO2: 95 %  O2 Device (Oxygen Therapy): nasal cannula    Physical Exam   Constitutional: He is oriented to person, place, and time. He appears well-nourished. No distress.   HENT:   Head: Normocephalic.   Eyes: Pupils are equal, round, and reactive to light.   Neck: No JVD present. No thyromegaly present.   Cardiovascular: Normal rate and regular rhythm. Exam reveals no friction rub.   No murmur heard.  Pulmonary/Chest: Effort normal. No respiratory distress. He has no wheezes.   Abdominal: Soft. He exhibits no  distension. There is no tenderness.   Musculoskeletal: He exhibits no edema.   Neurological: He is alert and oriented to person, place, and time.   Skin:        ICD St. Gabe    Vitals reviewed.      Significant Labs:   CMP:   Recent Labs   Lab 10/18/19  2151 10/19/19  0252 10/19/19  1718 10/19/19  2349 10/20/19  0313   *  128* 131* 130*  --  129*   K 4.0  4.0 4.0 3.7 3.9 3.6   CL 89*  89* 91* 88*  --  87*   CO2 27  27 26 26  --  27   *  205* 248* 293*  --  274*   BUN 56*  56* 56* 57*  --  56*   CREATININE 1.3  1.3 1.4 1.6*  --  1.6*   CALCIUM 9.6  9.6 10.0 9.9  --  9.6   PROT 7.2 7.0  --   --  7.3   ALBUMIN 2.9* 2.8*  --   --  3.0*   BILITOT 0.4 0.3  --   --  0.3   ALKPHOS 128 124  --   --  130   AST 15 14  --   --  12   ALT 8* 9*  --   --  10   ANIONGAP 12  12 14 16  --  15   ESTGFRAFRICA >60.0  >60.0 >60.0 51.9*  --  51.9*   EGFRNONAA 57.7*  57.7* 52.7* 44.9*  --  44.9*    and CBC:   Recent Labs   Lab 10/18/19  2321 10/19/19  0252 10/20/19  0313   WBC 11.65 11.57 14.37*   HGB 13.0* 12.6* 13.3*   HCT 40.8 39.0* 39.1*   * 399* 457*       Significant Imaging: n/a

## 2019-10-20 NOTE — NURSING
Pt has gone into vtach and shocked 3 times by ICD this morning. Team notified, will continue to monitor.

## 2019-10-20 NOTE — HOSPITAL COURSE
Patient admitted to CCU on 10/18 with concerns of multiple AICD shocks for his VT storm. Patient had continued amiodarone and lidocaine infusion with plans to undergo ablation on 10/21/19. Hospital course so far complicated by multiple shocks leading to severe patient discomfort. Symptoms managed with prn Xanax. Patient underwent ablation procedure on 10/21. He had 6 foci, 4 of which were ablated. Despite this, patient continued to have his AICD fire for vtach. Discussed with CTS and heart failure. Patient is not a candidate for advanced option. After speaking to the patient and his family, he has decided that he wants the AICD turned off and wants to transition to inpatient hospice closer to his home. Social work consult placed for this, will discharge patient on oral antiarrythmic medications.

## 2019-10-20 NOTE — PLAN OF CARE
CMICU DAILY GOALS       A: Awake    RASS: Goal - RASS Goal: 0-->alert and calm  Actual - RASS (Lema Agitation-Sedation Scale): 0-->alert and calm   Restraint necessity:    B: Breath   SBT: Not intubated   C: Coordinate A & B, analgesics/sedatives   Pain: managed    SAT: Not intubated  D: Delirium   CAM-ICU: Overall CAM-ICU: Negative  E: Early Mobility   MOVE Screen: Pass   Activity: Activity Management: activity adjusted per tolerance  FAS: Feeding/Nutrition   Diet order: Diet/Nutrition Received: consistent carb/diabetic diet, 2 gram sodium,   Fluid restriction:    T: Thrombus   DVT prophylaxis: VTE Required Core Measure: Pharmacological prophylaxis initiated/maintained  H: HOB Elevation   Head of Bed (HOB): HOB at 20-30 degrees  U: Ulcer Prophylaxis   GI: no  G: Glucose control   uncontrolled Glycemic Management: blood glucose monitoring  S: Skin   Bundle compliance: yes   Bathing/Skin Care: back care, bath, chlorhexidine, bath, complete, dressed/undressed, linen changed Date: [unfilled]  B: Bowel Function   no issues   I: Indwelling Catheters   Samson necessity: [REMOVED]      Urethral Catheter 10/17/19-Reason for Continuing Urinary Catheterization: Critically ill in ICU requiring intensive monitoring       Urethral Catheter 10/19/19 2030-Reason for Continuing Urinary Catheterization: Critically ill in ICU requiring intensive monitoring   CVC necessity: No   IPAD offered: Yes  D: De-escalation Antibx   No  Plan for the day   Manage anxiety from ICD shocks, ablation scheduled tomorrow   Family/Goals of care/Code Status   Code Status: Full Code     No acute events throughout day, VS and assessment per flow sheet, patient progressing towards goals as tolerated, plan of care reviewed with Lázaro Sarah and family, all concerns addressed, will continue to monitor.    Irene Dempsey

## 2019-10-20 NOTE — ASSESSMENT & PLAN NOTE
- Low concern for infection in the setting of no CXR findings suggestive of lobar consolidation- Stable currently on supplementation oxygen with goal of O2 sat > 90%   - Known to have MEGAN and using CPAP QHS - Continue

## 2019-10-20 NOTE — ASSESSMENT & PLAN NOTE
- Recent LHC showed patent Radial-OM; LIMA-LAD; TOD to distal right disease to mLCX with collaterals to RCA. No intervention.   - Continue on ASA   - Lipid panel reviewed

## 2019-10-21 ENCOUNTER — ANESTHESIA EVENT (OUTPATIENT)
Dept: MEDSURG UNIT | Facility: HOSPITAL | Age: 64
DRG: 273 | End: 2019-10-21
Payer: MEDICARE

## 2019-10-21 ENCOUNTER — ANESTHESIA (OUTPATIENT)
Dept: MEDSURG UNIT | Facility: HOSPITAL | Age: 64
DRG: 273 | End: 2019-10-21
Payer: MEDICARE

## 2019-10-21 PROBLEM — I47.20 VENTRICULAR TACHYCARDIA: Status: ACTIVE | Noted: 2019-10-21

## 2019-10-21 LAB
ABO + RH BLD: NORMAL
ALBUMIN SERPL BCP-MCNC: 2.9 G/DL (ref 3.5–5.2)
ALP SERPL-CCNC: 133 U/L (ref 55–135)
ALT SERPL W/O P-5'-P-CCNC: 9 U/L (ref 10–44)
ANION GAP SERPL CALC-SCNC: 13 MMOL/L (ref 8–16)
ANION GAP SERPL CALC-SCNC: 16 MMOL/L (ref 8–16)
ANISOCYTOSIS BLD QL SMEAR: SLIGHT
AST SERPL-CCNC: 10 U/L (ref 10–40)
BASO STIPL BLD QL SMEAR: ABNORMAL
BASOPHILS # BLD AUTO: ABNORMAL K/UL (ref 0–0.2)
BASOPHILS NFR BLD: 0 % (ref 0–1.9)
BILIRUB SERPL-MCNC: 0.4 MG/DL (ref 0.1–1)
BLD GP AB SCN CELLS X3 SERPL QL: NORMAL
BUN SERPL-MCNC: 33 MG/DL (ref 8–23)
BUN SERPL-MCNC: 41 MG/DL (ref 8–23)
CALCIUM SERPL-MCNC: 9.7 MG/DL (ref 8.7–10.5)
CALCIUM SERPL-MCNC: 9.8 MG/DL (ref 8.7–10.5)
CHLORIDE SERPL-SCNC: 88 MMOL/L (ref 95–110)
CHLORIDE SERPL-SCNC: 89 MMOL/L (ref 95–110)
CO2 SERPL-SCNC: 23 MMOL/L (ref 23–29)
CO2 SERPL-SCNC: 27 MMOL/L (ref 23–29)
CREAT SERPL-MCNC: 1.3 MG/DL (ref 0.5–1.4)
CREAT SERPL-MCNC: 1.3 MG/DL (ref 0.5–1.4)
DIFFERENTIAL METHOD: ABNORMAL
EOSINOPHIL # BLD AUTO: ABNORMAL K/UL (ref 0–0.5)
EOSINOPHIL NFR BLD: 1 % (ref 0–8)
ERYTHROCYTE [DISTWIDTH] IN BLOOD BY AUTOMATED COUNT: 12 % (ref 11.5–14.5)
EST. GFR  (AFRICAN AMERICAN): >60 ML/MIN/1.73 M^2
EST. GFR  (AFRICAN AMERICAN): >60 ML/MIN/1.73 M^2
EST. GFR  (NON AFRICAN AMERICAN): 57.7 ML/MIN/1.73 M^2
EST. GFR  (NON AFRICAN AMERICAN): 57.7 ML/MIN/1.73 M^2
GLUCOSE SERPL-MCNC: 234 MG/DL (ref 70–110)
GLUCOSE SERPL-MCNC: 281 MG/DL (ref 70–110)
HCT VFR BLD AUTO: 39.4 % (ref 40–54)
HGB BLD-MCNC: 12.8 G/DL (ref 14–18)
IMM GRANULOCYTES # BLD AUTO: ABNORMAL K/UL (ref 0–0.04)
IMM GRANULOCYTES NFR BLD AUTO: ABNORMAL % (ref 0–0.5)
LIDOCAIN SERPL-MCNC: 7.6 MCG/ML (ref 1.5–5)
LYMPHOCYTES # BLD AUTO: ABNORMAL K/UL (ref 1–4.8)
LYMPHOCYTES NFR BLD: 24.5 % (ref 18–48)
MAGNESIUM SERPL-MCNC: 1.9 MG/DL (ref 1.6–2.6)
MAGNESIUM SERPL-MCNC: 2.1 MG/DL (ref 1.6–2.6)
MCH RBC QN AUTO: 28.3 PG (ref 27–31)
MCHC RBC AUTO-ENTMCNC: 32.5 G/DL (ref 32–36)
MCV RBC AUTO: 87 FL (ref 82–98)
MONOCYTES # BLD AUTO: ABNORMAL K/UL (ref 0.3–1)
MONOCYTES NFR BLD: 11 % (ref 4–15)
MYELOCYTES NFR BLD MANUAL: 1 %
NEUTROPHILS NFR BLD: 62 % (ref 38–73)
NEUTS BAND NFR BLD MANUAL: 0.5 %
NRBC BLD-RTO: 0 /100 WBC
PHOSPHATE SERPL-MCNC: 3 MG/DL (ref 2.7–4.5)
PLATELET # BLD AUTO: 473 K/UL (ref 150–350)
PLATELET BLD QL SMEAR: ABNORMAL
PMV BLD AUTO: 10.7 FL (ref 9.2–12.9)
POC ACTIVATED CLOTTING TIME K: 307 SEC (ref 74–137)
POC ACTIVATED CLOTTING TIME K: 312 SEC (ref 74–137)
POC ACTIVATED CLOTTING TIME K: 323 SEC (ref 74–137)
POC ACTIVATED CLOTTING TIME K: 334 SEC (ref 74–137)
POC ACTIVATED CLOTTING TIME K: 389 SEC (ref 74–137)
POC ACTIVATED CLOTTING TIME K: 516 SEC (ref 74–137)
POC ACTIVATED CLOTTING TIME K: 747 SEC (ref 74–137)
POCT GLUCOSE: 223 MG/DL (ref 70–110)
POCT GLUCOSE: 310 MG/DL (ref 70–110)
POTASSIUM SERPL-SCNC: 3.6 MMOL/L (ref 3.5–5.1)
POTASSIUM SERPL-SCNC: 3.7 MMOL/L (ref 3.5–5.1)
PROT SERPL-MCNC: 7 G/DL (ref 6–8.4)
RBC # BLD AUTO: 4.53 M/UL (ref 4.6–6.2)
SAMPLE: ABNORMAL
SODIUM SERPL-SCNC: 127 MMOL/L (ref 136–145)
SODIUM SERPL-SCNC: 129 MMOL/L (ref 136–145)
WBC # BLD AUTO: 14.8 K/UL (ref 3.9–12.7)

## 2019-10-21 PROCEDURE — 20000000 HC ICU ROOM

## 2019-10-21 PROCEDURE — D9220A PRA ANESTHESIA: Mod: ANES,,, | Performed by: ANESTHESIOLOGY

## 2019-10-21 PROCEDURE — 84100 ASSAY OF PHOSPHORUS: CPT

## 2019-10-21 PROCEDURE — 25000003 PHARM REV CODE 250: Performed by: INTERNAL MEDICINE

## 2019-10-21 PROCEDURE — D9220A PRA ANESTHESIA: Mod: CRNA,,, | Performed by: NURSE ANESTHETIST, CERTIFIED REGISTERED

## 2019-10-21 PROCEDURE — 99900035 HC TECH TIME PER 15 MIN (STAT)

## 2019-10-21 PROCEDURE — D9220A PRA ANESTHESIA: ICD-10-PCS | Mod: ANES,,, | Performed by: ANESTHESIOLOGY

## 2019-10-21 PROCEDURE — 27201037 HC PRESSURE MONITORING SET UP

## 2019-10-21 PROCEDURE — 63600175 PHARM REV CODE 636 W HCPCS: Performed by: STUDENT IN AN ORGANIZED HEALTH CARE EDUCATION/TRAINING PROGRAM

## 2019-10-21 PROCEDURE — 63600175 PHARM REV CODE 636 W HCPCS: Performed by: INTERNAL MEDICINE

## 2019-10-21 PROCEDURE — C1730 CATH, EP, 19 OR FEW ELECT: HCPCS | Performed by: INTERNAL MEDICINE

## 2019-10-21 PROCEDURE — 63600175 PHARM REV CODE 636 W HCPCS

## 2019-10-21 PROCEDURE — 93462 L HRT CATH TRNSPTL PUNCTURE: CPT | Performed by: INTERNAL MEDICINE

## 2019-10-21 PROCEDURE — 93654 COMPRE EP EVAL TX VT: CPT | Performed by: INTERNAL MEDICINE

## 2019-10-21 PROCEDURE — 93005 ELECTROCARDIOGRAM TRACING: CPT

## 2019-10-21 PROCEDURE — 93010 EKG 12-LEAD: ICD-10-PCS | Mod: ,,, | Performed by: INTERNAL MEDICINE

## 2019-10-21 PROCEDURE — C1892 INTRO/SHEATH,FIXED,PEEL-AWAY: HCPCS | Performed by: INTERNAL MEDICINE

## 2019-10-21 PROCEDURE — 93462 PR LEFT HEART CATH BY TRANSEPTAL PUNCTURE: ICD-10-PCS | Mod: ,,, | Performed by: INTERNAL MEDICINE

## 2019-10-21 PROCEDURE — 93662 INTRACARDIAC ECG (ICE): CPT | Mod: 26,,, | Performed by: INTERNAL MEDICINE

## 2019-10-21 PROCEDURE — 94761 N-INVAS EAR/PLS OXIMETRY MLT: CPT

## 2019-10-21 PROCEDURE — 80048 BASIC METABOLIC PNL TOTAL CA: CPT

## 2019-10-21 PROCEDURE — 25000003 PHARM REV CODE 250: Performed by: STUDENT IN AN ORGANIZED HEALTH CARE EDUCATION/TRAINING PROGRAM

## 2019-10-21 PROCEDURE — 37000008 HC ANESTHESIA 1ST 15 MINUTES: Performed by: INTERNAL MEDICINE

## 2019-10-21 PROCEDURE — 83735 ASSAY OF MAGNESIUM: CPT | Mod: 91

## 2019-10-21 PROCEDURE — C1894 INTRO/SHEATH, NON-LASER: HCPCS | Performed by: INTERNAL MEDICINE

## 2019-10-21 PROCEDURE — 93662 INTRACARDIAC ECG (ICE): CPT | Performed by: INTERNAL MEDICINE

## 2019-10-21 PROCEDURE — 93621: ICD-10-PCS | Mod: 26,,, | Performed by: INTERNAL MEDICINE

## 2019-10-21 PROCEDURE — 37000009 HC ANESTHESIA EA ADD 15 MINS: Performed by: INTERNAL MEDICINE

## 2019-10-21 PROCEDURE — 25000003 PHARM REV CODE 250: Performed by: NURSE ANESTHETIST, CERTIFIED REGISTERED

## 2019-10-21 PROCEDURE — D9220A PRA ANESTHESIA: ICD-10-PCS | Mod: CRNA,,, | Performed by: NURSE ANESTHETIST, CERTIFIED REGISTERED

## 2019-10-21 PROCEDURE — 63600175 PHARM REV CODE 636 W HCPCS: Performed by: NURSE ANESTHETIST, CERTIFIED REGISTERED

## 2019-10-21 PROCEDURE — 25000003 PHARM REV CODE 250

## 2019-10-21 PROCEDURE — C2630 CATH, EP, COOL-TIP: HCPCS | Performed by: INTERNAL MEDICINE

## 2019-10-21 PROCEDURE — 93010 ELECTROCARDIOGRAM REPORT: CPT | Mod: ,,, | Performed by: INTERNAL MEDICINE

## 2019-10-21 PROCEDURE — 27201423 OPTIME MED/SURG SUP & DEVICES STERILE SUPPLY: Performed by: INTERNAL MEDICINE

## 2019-10-21 PROCEDURE — 36620 PR INSERT CATH,ART,PERCUT,SHORTTERM: ICD-10-PCS | Mod: 59,,, | Performed by: ANESTHESIOLOGY

## 2019-10-21 PROCEDURE — 27000221 HC OXYGEN, UP TO 24 HOURS

## 2019-10-21 PROCEDURE — 99231 PR SUBSEQUENT HOSPITAL CARE,LEVL I: ICD-10-PCS | Mod: GC,,, | Performed by: INTERNAL MEDICINE

## 2019-10-21 PROCEDURE — 36620 INSERTION CATHETER ARTERY: CPT | Mod: 59,,, | Performed by: ANESTHESIOLOGY

## 2019-10-21 PROCEDURE — C1887 CATHETER, GUIDING: HCPCS | Performed by: INTERNAL MEDICINE

## 2019-10-21 PROCEDURE — 99231 SBSQ HOSP IP/OBS SF/LOW 25: CPT | Mod: GC,,, | Performed by: INTERNAL MEDICINE

## 2019-10-21 PROCEDURE — 86901 BLOOD TYPING SEROLOGIC RH(D): CPT

## 2019-10-21 PROCEDURE — 93462 L HRT CATH TRNSPTL PUNCTURE: CPT | Mod: ,,, | Performed by: INTERNAL MEDICINE

## 2019-10-21 PROCEDURE — 93621 COMP EP EVL L PAC&REC C SINS: CPT | Performed by: INTERNAL MEDICINE

## 2019-10-21 PROCEDURE — 93621 COMP EP EVL L PAC&REC C SINS: CPT | Mod: 26,,, | Performed by: INTERNAL MEDICINE

## 2019-10-21 PROCEDURE — 93654 PR ELECTROPHYS EVAL, COMPREHEN, W/VENTRIC TACHYCARD/VENTRIC ECTOPY TRMT: ICD-10-PCS | Mod: ,,, | Performed by: INTERNAL MEDICINE

## 2019-10-21 PROCEDURE — 85007 BL SMEAR W/DIFF WBC COUNT: CPT

## 2019-10-21 PROCEDURE — 85027 COMPLETE CBC AUTOMATED: CPT

## 2019-10-21 PROCEDURE — 93654 COMPRE EP EVAL TX VT: CPT | Mod: ,,, | Performed by: INTERNAL MEDICINE

## 2019-10-21 PROCEDURE — C1753 CATH, INTRAVAS ULTRASOUND: HCPCS | Performed by: INTERNAL MEDICINE

## 2019-10-21 PROCEDURE — 80053 COMPREHEN METABOLIC PANEL: CPT

## 2019-10-21 PROCEDURE — 94660 CPAP INITIATION&MGMT: CPT

## 2019-10-21 PROCEDURE — 93662 PR INTRACARD ECHO, THER/DX INTERVENT: ICD-10-PCS | Mod: 26,,, | Performed by: INTERNAL MEDICINE

## 2019-10-21 RX ORDER — LORAZEPAM 2 MG/ML
0.5 INJECTION INTRAMUSCULAR ONCE
Status: COMPLETED | OUTPATIENT
Start: 2019-10-21 | End: 2019-10-21

## 2019-10-21 RX ORDER — LORAZEPAM 2 MG/ML
INJECTION INTRAMUSCULAR
Status: DISCONTINUED
Start: 2019-10-21 | End: 2019-10-21 | Stop reason: WASHOUT

## 2019-10-21 RX ORDER — MIDAZOLAM HYDROCHLORIDE 1 MG/ML
INJECTION, SOLUTION INTRAMUSCULAR; INTRAVENOUS
Status: DISCONTINUED | OUTPATIENT
Start: 2019-10-21 | End: 2019-10-21

## 2019-10-21 RX ORDER — MIDAZOLAM HYDROCHLORIDE 1 MG/ML
2 INJECTION INTRAMUSCULAR; INTRAVENOUS ONCE
Status: COMPLETED | OUTPATIENT
Start: 2019-10-21 | End: 2019-10-21

## 2019-10-21 RX ORDER — DEXMEDETOMIDINE HYDROCHLORIDE 4 UG/ML
0.2 INJECTION, SOLUTION INTRAVENOUS CONTINUOUS
Status: DISCONTINUED | OUTPATIENT
Start: 2019-10-21 | End: 2019-10-24 | Stop reason: HOSPADM

## 2019-10-21 RX ORDER — CEFAZOLIN SODIUM 1 G/3ML
INJECTION, POWDER, FOR SOLUTION INTRAMUSCULAR; INTRAVENOUS
Status: DISCONTINUED | OUTPATIENT
Start: 2019-10-21 | End: 2019-10-21

## 2019-10-21 RX ORDER — HEPARIN SODIUM 1000 [USP'U]/ML
INJECTION, SOLUTION INTRAVENOUS; SUBCUTANEOUS
Status: DISCONTINUED | OUTPATIENT
Start: 2019-10-21 | End: 2019-10-21

## 2019-10-21 RX ORDER — FENTANYL CITRATE 50 UG/ML
INJECTION, SOLUTION INTRAMUSCULAR; INTRAVENOUS
Status: DISCONTINUED | OUTPATIENT
Start: 2019-10-21 | End: 2019-10-21

## 2019-10-21 RX ORDER — LORAZEPAM 2 MG/ML
INJECTION INTRAMUSCULAR
Status: DISPENSED
Start: 2019-10-21 | End: 2019-10-22

## 2019-10-21 RX ORDER — POTASSIUM CHLORIDE 750 MG/1
30 CAPSULE, EXTENDED RELEASE ORAL ONCE
Status: COMPLETED | OUTPATIENT
Start: 2019-10-21 | End: 2019-10-21

## 2019-10-21 RX ORDER — DEXMEDETOMIDINE HYDROCHLORIDE 4 UG/ML
INJECTION, SOLUTION INTRAVENOUS
Status: COMPLETED
Start: 2019-10-21 | End: 2019-10-21

## 2019-10-21 RX ORDER — MIDAZOLAM HYDROCHLORIDE 1 MG/ML
INJECTION INTRAMUSCULAR; INTRAVENOUS
Status: COMPLETED
Start: 2019-10-21 | End: 2019-10-21

## 2019-10-21 RX ORDER — SODIUM CHLORIDE 0.9 % (FLUSH) 0.9 %
10 SYRINGE (ML) INJECTION
Status: DISCONTINUED | OUTPATIENT
Start: 2019-10-21 | End: 2019-10-24 | Stop reason: HOSPADM

## 2019-10-21 RX ORDER — PHENYLEPHRINE HCL IN 0.9% NACL 1 MG/10 ML
SYRINGE (ML) INTRAVENOUS
Status: DISPENSED
Start: 2019-10-21 | End: 2019-10-22

## 2019-10-21 RX ORDER — NITROGLYCERIN 5 MG/ML
INJECTION, SOLUTION INTRAVENOUS
Status: DISCONTINUED | OUTPATIENT
Start: 2019-10-21 | End: 2019-10-21

## 2019-10-21 RX ORDER — LIDOCAINE HYDROCHLORIDE 20 MG/ML
INJECTION, SOLUTION INFILTRATION; PERINEURAL
Status: DISCONTINUED | OUTPATIENT
Start: 2019-10-21 | End: 2019-10-24 | Stop reason: HOSPADM

## 2019-10-21 RX ORDER — NICARDIPINE HYDROCHLORIDE 0.2 MG/ML
INJECTION INTRAVENOUS CONTINUOUS PRN
Status: DISCONTINUED | OUTPATIENT
Start: 2019-10-21 | End: 2019-10-21

## 2019-10-21 RX ORDER — ETOMIDATE 2 MG/ML
INJECTION INTRAVENOUS
Status: DISCONTINUED | OUTPATIENT
Start: 2019-10-21 | End: 2019-10-21

## 2019-10-21 RX ORDER — MAGNESIUM SULFATE HEPTAHYDRATE 40 MG/ML
2 INJECTION, SOLUTION INTRAVENOUS ONCE
Status: COMPLETED | OUTPATIENT
Start: 2019-10-21 | End: 2019-10-22

## 2019-10-21 RX ORDER — LIDOCAINE HYDROCHLORIDE ANHYDROUS AND DEXTROSE MONOHYDRATE .8; 5 G/100ML; G/100ML
0.5 INJECTION, SOLUTION INTRAVENOUS CONTINUOUS
Status: DISCONTINUED | OUTPATIENT
Start: 2019-10-21 | End: 2019-10-24 | Stop reason: HOSPADM

## 2019-10-21 RX ORDER — FUROSEMIDE 10 MG/ML
INJECTION INTRAMUSCULAR; INTRAVENOUS
Status: DISCONTINUED | OUTPATIENT
Start: 2019-10-21 | End: 2019-10-21

## 2019-10-21 RX ORDER — CEFAZOLIN SODIUM 1 G/3ML
2 INJECTION, POWDER, FOR SOLUTION INTRAMUSCULAR; INTRAVENOUS
Status: DISCONTINUED | OUTPATIENT
Start: 2019-10-21 | End: 2019-10-23

## 2019-10-21 RX ORDER — PROTAMINE SULFATE 10 MG/ML
INJECTION, SOLUTION INTRAVENOUS
Status: DISCONTINUED | OUTPATIENT
Start: 2019-10-21 | End: 2019-10-21

## 2019-10-21 RX ORDER — POTASSIUM CHLORIDE 20 MEQ/1
40 TABLET, EXTENDED RELEASE ORAL ONCE
Status: COMPLETED | OUTPATIENT
Start: 2019-10-21 | End: 2019-10-21

## 2019-10-21 RX ORDER — LIDOCAINE HCL/PF 100 MG/5ML
SYRINGE (ML) INTRAVENOUS
Status: DISCONTINUED | OUTPATIENT
Start: 2019-10-21 | End: 2019-10-21

## 2019-10-21 RX ORDER — LORAZEPAM 2 MG/ML
INJECTION INTRAMUSCULAR
Status: COMPLETED
Start: 2019-10-21 | End: 2019-10-21

## 2019-10-21 RX ORDER — EPHEDRINE SULFATE 50 MG/ML
INJECTION, SOLUTION INTRAVENOUS
Status: DISCONTINUED | OUTPATIENT
Start: 2019-10-21 | End: 2019-10-21

## 2019-10-21 RX ORDER — MEXILETINE HYDROCHLORIDE 150 MG/1
150 CAPSULE ORAL EVERY 8 HOURS
Status: DISCONTINUED | OUTPATIENT
Start: 2019-10-21 | End: 2019-10-22

## 2019-10-21 RX ORDER — ROCURONIUM BROMIDE 10 MG/ML
INJECTION, SOLUTION INTRAVENOUS
Status: DISCONTINUED | OUTPATIENT
Start: 2019-10-21 | End: 2019-10-21

## 2019-10-21 RX ORDER — HEPARIN SODIUM 200 [USP'U]/100ML
INJECTION, SOLUTION INTRAVENOUS
Status: DISCONTINUED | OUTPATIENT
Start: 2019-10-21 | End: 2019-10-23

## 2019-10-21 RX ORDER — PHENYLEPHRINE HYDROCHLORIDE 10 MG/ML
INJECTION INTRAVENOUS
Status: DISCONTINUED | OUTPATIENT
Start: 2019-10-21 | End: 2019-10-21

## 2019-10-21 RX ADMIN — NITROGLYCERIN 50 MCG: 5 INJECTION, SOLUTION INTRAVENOUS at 05:10

## 2019-10-21 RX ADMIN — TIMOLOL MALEATE 1 DROP: 5 SOLUTION OPHTHALMIC at 08:10

## 2019-10-21 RX ADMIN — AMIODARONE HYDROCHLORIDE 300 MG: 50 INJECTION, SOLUTION INTRAVENOUS at 05:10

## 2019-10-21 RX ADMIN — DORZOLAMIDE HYDROCHLORIDE 1 DROP: 20 SOLUTION/ DROPS OPHTHALMIC at 08:10

## 2019-10-21 RX ADMIN — HYDRALAZINE HYDROCHLORIDE AND ISOSORBIDE DINITRATE 1 TABLET: 37.5; 2 TABLET, FILM COATED ORAL at 08:10

## 2019-10-21 RX ADMIN — POTASSIUM CHLORIDE 30 MEQ: 750 CAPSULE, EXTENDED RELEASE ORAL at 10:10

## 2019-10-21 RX ADMIN — FENTANYL CITRATE 25 MCG: 50 INJECTION, SOLUTION INTRAMUSCULAR; INTRAVENOUS at 12:10

## 2019-10-21 RX ADMIN — ROCURONIUM BROMIDE 20 MG: 10 INJECTION, SOLUTION INTRAVENOUS at 04:10

## 2019-10-21 RX ADMIN — HEPARIN SODIUM 4000 UNITS: 1000 INJECTION INTRAVENOUS; SUBCUTANEOUS at 02:10

## 2019-10-21 RX ADMIN — NICARDIPINE HYDROCHLORIDE 5 MG/HR: 0.2 INJECTION, SOLUTION INTRAVENOUS at 05:10

## 2019-10-21 RX ADMIN — LIDOCAINE HYDROCHLORIDE 150 MG: 20 INJECTION, SOLUTION INTRAVENOUS at 05:10

## 2019-10-21 RX ADMIN — ROCURONIUM BROMIDE 30 MG: 10 INJECTION, SOLUTION INTRAVENOUS at 01:10

## 2019-10-21 RX ADMIN — FENTANYL CITRATE 75 MCG: 50 INJECTION, SOLUTION INTRAMUSCULAR; INTRAVENOUS at 12:10

## 2019-10-21 RX ADMIN — EPHEDRINE SULFATE 5 MG: 50 INJECTION, SOLUTION INTRAMUSCULAR; INTRAVENOUS; SUBCUTANEOUS at 04:10

## 2019-10-21 RX ADMIN — INSULIN ASPART 4 UNITS: 100 INJECTION, SOLUTION INTRAVENOUS; SUBCUTANEOUS at 08:10

## 2019-10-21 RX ADMIN — GABAPENTIN 300 MG: 300 CAPSULE ORAL at 08:10

## 2019-10-21 RX ADMIN — FUROSEMIDE 80 MG: 10 INJECTION, SOLUTION INTRAMUSCULAR; INTRAVENOUS at 05:10

## 2019-10-21 RX ADMIN — AMIODARONE HYDROCHLORIDE 1 MG/MIN: 1.8 INJECTION, SOLUTION INTRAVENOUS at 09:10

## 2019-10-21 RX ADMIN — HEPARIN SODIUM 1000 UNITS: 1000 INJECTION INTRAVENOUS; SUBCUTANEOUS at 02:10

## 2019-10-21 RX ADMIN — MAGNESIUM SULFATE IN WATER 2 G: 40 INJECTION, SOLUTION INTRAVENOUS at 10:10

## 2019-10-21 RX ADMIN — LORAZEPAM 0.5 MG: 2 INJECTION INTRAMUSCULAR; INTRAVENOUS at 09:10

## 2019-10-21 RX ADMIN — MIDAZOLAM HYDROCHLORIDE 2 MG: 1 INJECTION, SOLUTION INTRAMUSCULAR; INTRAVENOUS at 10:10

## 2019-10-21 RX ADMIN — HEPARIN SODIUM 4000 UNITS: 1000 INJECTION INTRAVENOUS; SUBCUTANEOUS at 03:10

## 2019-10-21 RX ADMIN — AMIODARONE HYDROCHLORIDE 1 MG/MIN: 1.8 INJECTION, SOLUTION INTRAVENOUS at 08:10

## 2019-10-21 RX ADMIN — HEPARIN SODIUM 15000 UNITS: 1000 INJECTION INTRAVENOUS; SUBCUTANEOUS at 01:10

## 2019-10-21 RX ADMIN — MIDAZOLAM HYDROCHLORIDE 1 MG: 1 INJECTION, SOLUTION INTRAMUSCULAR; INTRAVENOUS at 12:10

## 2019-10-21 RX ADMIN — PHENYLEPHRINE HYDROCHLORIDE 100 MCG: 10 INJECTION INTRAVENOUS at 12:10

## 2019-10-21 RX ADMIN — AMIODARONE HYDROCHLORIDE 1 MG/MIN: 1.8 INJECTION, SOLUTION INTRAVENOUS at 02:10

## 2019-10-21 RX ADMIN — PHENYLEPHRINE HYDROCHLORIDE 200 MCG: 10 INJECTION INTRAVENOUS at 03:10

## 2019-10-21 RX ADMIN — PROTAMINE SULFATE 60 MG: 10 INJECTION, SOLUTION INTRAVENOUS at 05:10

## 2019-10-21 RX ADMIN — CARVEDILOL 12.5 MG: 12.5 TABLET, FILM COATED ORAL at 08:10

## 2019-10-21 RX ADMIN — MEXILETINE HYDROCHLORIDE 150 MG: 150 CAPSULE ORAL at 06:10

## 2019-10-21 RX ADMIN — ALPRAZOLAM 2 MG: 0.5 TABLET ORAL at 08:10

## 2019-10-21 RX ADMIN — NITROGLYCERIN 100 MCG: 5 INJECTION, SOLUTION INTRAVENOUS at 05:10

## 2019-10-21 RX ADMIN — ALPRAZOLAM 2 MG: 0.5 TABLET ORAL at 05:10

## 2019-10-21 RX ADMIN — CEFAZOLIN 2 G: 330 INJECTION, POWDER, FOR SOLUTION INTRAMUSCULAR; INTRAVENOUS at 01:10

## 2019-10-21 RX ADMIN — POTASSIUM CHLORIDE 40 MEQ: 1500 TABLET, EXTENDED RELEASE ORAL at 05:10

## 2019-10-21 RX ADMIN — INSULIN ASPART 3 UNITS: 100 INJECTION, SOLUTION INTRAVENOUS; SUBCUTANEOUS at 11:10

## 2019-10-21 RX ADMIN — LIDOCAINE HYDROCHLORIDE 100 MG: 20 INJECTION, SOLUTION INTRAVENOUS at 12:10

## 2019-10-21 RX ADMIN — HUMAN ALBUMIN MICROSPHERES AND PERFLUTREN 0.66 MG: 10; .22 INJECTION, SOLUTION INTRAVENOUS at 10:10

## 2019-10-21 RX ADMIN — LORAZEPAM 0.5 MG: 2 INJECTION INTRAMUSCULAR; INTRAVENOUS at 10:10

## 2019-10-21 RX ADMIN — ROCURONIUM BROMIDE 50 MG: 10 INJECTION, SOLUTION INTRAVENOUS at 12:10

## 2019-10-21 RX ADMIN — SODIUM CHLORIDE 0.25 MCG/KG/MIN: 9 INJECTION, SOLUTION INTRAVENOUS at 01:10

## 2019-10-21 RX ADMIN — ETOMIDATE 8 MG: 2 INJECTION, SOLUTION INTRAVENOUS at 01:10

## 2019-10-21 RX ADMIN — MIDAZOLAM HYDROCHLORIDE 2 MG: 1 INJECTION INTRAMUSCULAR; INTRAVENOUS at 10:10

## 2019-10-21 RX ADMIN — SODIUM CHLORIDE, SODIUM GLUCONATE, SODIUM ACETATE, POTASSIUM CHLORIDE, MAGNESIUM CHLORIDE, SODIUM PHOSPHATE, DIBASIC, AND POTASSIUM PHOSPHATE: .53; .5; .37; .037; .03; .012; .00082 INJECTION, SOLUTION INTRAVENOUS at 12:10

## 2019-10-21 RX ADMIN — ETOMIDATE 15 MG: 2 INJECTION, SOLUTION INTRAVENOUS at 12:10

## 2019-10-21 RX ADMIN — LORAZEPAM 0.5 MG: 2 INJECTION INTRAMUSCULAR at 10:10

## 2019-10-21 RX ADMIN — PHENYLEPHRINE HYDROCHLORIDE 200 MCG: 10 INJECTION INTRAVENOUS at 02:10

## 2019-10-21 RX ADMIN — DEXMEDETOMIDINE HYDROCHLORIDE 0.2 MCG/KG/HR: 4 INJECTION, SOLUTION INTRAVENOUS at 10:10

## 2019-10-21 RX ADMIN — INSULIN ASPART 3 UNITS: 100 INJECTION, SOLUTION INTRAVENOUS; SUBCUTANEOUS at 08:10

## 2019-10-21 RX ADMIN — ASPIRIN 81 MG: 81 TABLET, COATED ORAL at 08:10

## 2019-10-21 RX ADMIN — ROCURONIUM BROMIDE 10 MG: 10 INJECTION, SOLUTION INTRAVENOUS at 04:10

## 2019-10-21 NOTE — BRIEF OP NOTE
: Cameron Sun MD    Post-operative Diagnosis: VT    Procedure Performed: RFA for substrate/scar modification for treatment of VT    Description of Procedure: The patient was brought to the EP lab in the fasting state. Prepped and draped in sterile fashion. Safety timeout was performed. Sedation administered by anesthesia staff. Ultrasound guided venous access of the bilateral femoral veins was performed. ICE, CS, and RV catheters placed via left femoral vein access. Single transseptal puncture performed using combination of fluoroscopic and ICE guidance. Heparin bolus and continuous infusion per protocol. Map created. RFA for scar/substrate modification in the LV. ICE confirmed no significant pericardial effusion.     EBL: <10 mL    Specimens Removed: None  Complications: no immediate    Resume amiodarone infusion at 1 for 24 hours, then decrease to 0.5 for 24 hours, and then will start oral regimen.     Resume lidocaine infusion at 2 for 48 hours.     Rudy Perrin MD PGY7

## 2019-10-21 NOTE — PLAN OF CARE
Extended Emergency Contact Information  Primary Emergency Contact: Jenni Camp  Mobile Phone: [phone]  Relation: Sister  Preferred language: English   needed? No    Cameron Arteaga DO  [address] / [place] [address]    No future appointments.    Payor: HU[employer] MANAGED MEDICARE / Plan: HU[employer]NA MEDICARE PFFS / Product Type: Medicare Advantage /       [facility] - [place], [place] - [address]  [address]  [facility] [address]  Phone: [phone] Fax: [phone]    [facility] - [place], [place] - [address]  [address]  [place] [address]  Phone: [phone] Fax: [phone]       10/21/19 162   Discharge Assessment   Assessment Type Discharge Planning Assessment   Confirmed/corrected address and phone number on facesheet? Yes   Assessment information obtained from? Medical Record;Caregiver   Expected Length of Stay (days) 5   Communicated expected length of stay with patient/caregiver yes   Prior to hospitilization cognitive status: Alert/Oriented   Prior to hospitalization functional status: Assistive Equipment;Independent   Current cognitive status: Alert/Oriented   Current Functional Status: Independent;Assistive Equipment   Lives With alone   Able to Return to Prior Arrangements yes   Is patient able to care for self after discharge? Yes   Patient's perception of discharge disposition home or selfcare   Readmission Within the Last 30 Days no previous admission in last 30 days   Patient currently being followed by outpatient case management? No   Patient currently receives any other outside agency services? No   Equipment Currently Used at Home cane, straight   Do you have any problems affording any of your prescribed medications? No   Is the patient taking medications as prescribed? yes   Does the patient have transportation home? Yes   Transportation Anticipated family or friend will provide   Does the patient receive services  at the [facility] Clinic? No   Discharge Plan A Home   Discharge Plan B Home   DME Needed Upon Discharge  none   Patient/Family in Agreement with Plan yes

## 2019-10-21 NOTE — ANESTHESIA PREPROCEDURE EVALUATION
[facility]  Anesthesia Pre-Operative Evaluation         Patient Name: Lázaro Sarah  YOB: 1955  MRN: [id number]    SUBJECTIVE:     Pre-operative evaluation for Procedure(s) (LRB):  Ablation, Scar VT (N/A)     10/21/2019    Lázaro Sarah is a 63 y.o. male w/ a significant PMHx of ischemic CMP EF 15-20%; CAD CABG 3/2008 (Radial-OM, LIMA to LAD and TOD to dRCA) AICD for HFrEF.    Patient was admitted to CCU on 10/18 with concerns of multiple AICD shocks for his VT storm. Patient had continued amiodarone and lidocaine infusion. Hospital course so far complicated by multiple shocks leading to severe patient discomfort    Patient now presents for the above procedure(s).      LDA:        Peripheral IV - Single Lumen 10/17/19 18 G Anterior;Left Upper Arm (Active)   Site Assessment Clean;Dry;Intact;No redness;No swelling;No warmth;No drainage 10/21/2019  7:01 AM   Line Status Infusing 10/21/2019  7:01 AM   Dressing Status Clean;Dry;Intact 10/21/2019  7:01 AM   Dressing Change Due 10/21/19 10/21/2019  7:01 AM   Site Change Due 10/21/19 10/21/2019  7:01 AM   Reason Not Rotated Poor venous access 10/21/2019  7:01 AM   Number of days: 4            Peripheral IV - Single Lumen 10/19/19 1730 22 G Anterior;Left Hand (Active)   Site Assessment Clean;Dry;Intact;No redness;No swelling;No warmth;No drainage 10/21/2019  7:01 AM   Line Status Infusing 10/21/2019  7:01 AM   Dressing Status Clean;Dry;Intact 10/21/2019  7:01 AM   Dressing Intervention New dressing 10/19/2019  5:43 PM   Dressing Change Due 10/23/19 10/21/2019  7:01 AM   Site Change Due 10/23/19 10/21/2019  7:01 AM   Reason Not Rotated Not due 10/21/2019  7:01 AM   Number of days: 1            Urethral Catheter 10/19/19 2030 (Active)   Site Assessment Clean;Intact 10/21/2019  7:01 AM   Collection Container Urimeter 10/21/2019  7:01 AM   Securement Method secured to top of thigh w/ adhesive device 10/21/2019  7:01 AM   Catheter Care Performed  "yes 10/21/2019  7:01 AM   Reason for Continuing Urinary Catheterization Critically ill in ICU requiring intensive monitoring 10/21/2019  7:01 AM   CAUTI Prevention Bundle StatLock in place w 1" slack;Intact seal between catheter & drainage tubing;Drainage bag/urimeter off the floor;Green sheeting clip in use;No dependent loops or kinks;Drainage bag/urimeter not overfilled (<2/3 full);Drainage bag/urimeter below bladder 10/21/2019  7:01 AM   Output (mL) 40 mL 10/21/2019  8:00 AM   Number of days: 1       Prev airway: None documented.    Drips:    amiodarone in dextrose 5% 1 mg/min (10/21/19 1000)    lidocaine 1 mg/min (10/21/19 1000)       Patient Active Problem List   Diagnosis    Acute on chronic systolic heart failure    Angioedema    Chronic rhinitis    Depression    Diabetes mellitus with neurological manifestations    Essential hypertension    CAD (coronary artery disease)    History of hypertension    Implantable cardioverter-defibrillator (ICD) generator end of life    Leukocytosis    Morbid obesity    MEGAN (obstructive sleep apnea)    Somnolence    Sustained ventricular tachycardia    Acute hypoxemic respiratory failure       Review of patient's allergies indicates:   Allergen Reactions    Ace inhibitors Swelling    Adhesive Hives    Novolin 70/30 (semi-synthetic) Hives    Codeine Nausea And Vomiting    Statins-hmg-coa reductase inhibitors Swelling    Bupropion hcl Rash       Current Inpatient Medications:   aspirin  81 mg Oral Daily    carvedilol  12.5 mg Oral BID    dorzolamide  1 drop Both Eyes BID    gabapentin  300 mg Oral QHS    insulin aspart U-100  3 Units Subcutaneous TIDWM    isosorbide-hydrALAZINE 20-37.5 mg  1 tablet Oral TID    timolol maleate 0.5%  1 drop Both Eyes BID       No current facility-administered medications on file prior to encounter.      No current outpatient medications on file prior to encounter.       Past Surgical History:   Procedure Laterality " "Date    ABDOMINAL SURGERY      hernia repair    CARDIAC SURGERY      open heart    EYE SURGERY      HERNIA REPAIR      SKIN BIOPSY         Social History     Socioeconomic History    Marital status: Single     Spouse name: Not on file    Number of children: Not on file    Years of education: Not on file    Highest education level: Not on file   Occupational History    Not on file   Social Needs    Financial resource strain: Not on file    Food insecurity:     Worry: Not on file     Inability: Not on file    Transportation needs:     Medical: Not on file     Non-medical: Not on file   Tobacco Use    Smoking status: Former Smoker     Types: Cigarettes     Last attempt to quit: 3/18/2006     Years since quittin.6   Substance and Sexual Activity    Alcohol use: Yes     Comment: per pt "once in a while"    Drug use: Yes     Comment: per pt "Carri on Percocet 2 yrs ago in July"    Sexual activity: Not Currently   Lifestyle    Physical activity:     Days per week: Not on file     Minutes per session: Not on file    Stress: Not on file   Relationships    Social connections:     Talks on phone: Not on file     Gets together: Not on file     Attends [employer] service: Not on file     Active member of club or organization: Not on file     Attends meetings of clubs or organizations: Not on file     Relationship status: Not on file   Other Topics Concern    Not on file   Social History Narrative    Not on file       OBJECTIVE:     Vital Signs Range (Last 24H):  Temp:  [36.4 °C (97.6 °F)-37 °C (98.6 °F)]   Pulse:  [60-66]   Resp:  [15-38]   BP: (104-151)/(70-97)   SpO2:  [93 %-99 %]       Significant Labs:  Lab Results   Component Value Date    WBC 14.80 (H) 10/21/2019    HGB 12.8 (L) 10/21/2019    HCT 39.4 (L) 10/21/2019     (H) 10/21/2019    CHOL 200 (H) 10/19/2019    TRIG 364 (H) 10/19/2019    HDL 17 (L) 10/19/2019    ALT 9 (L) 10/21/2019    AST 10 10/21/2019     (L) 10/21/2019    K 3.6 " 10/21/2019    CL 89 (L) 10/21/2019    CREATININE 1.3 10/21/2019    BUN 41 (H) 10/21/2019    CO2 27 10/21/2019    INR 1.3 (H) 10/18/2019    HGBA1C 7.0 (H) 10/18/2019       Diagnostic Studies: No relevant studies.    EKG:   Results for orders placed or performed during the hospital encounter of 10/18/19   EKG 12-lead    Collection Time: 10/19/19  6:37 PM    Narrative    Test Reason : (Not Selected)    Vent. Rate : 127 BPM     Atrial Rate : 063 BPM     P-R Int : 000 ms          QRS Dur : 172 ms      QT Int : 452 ms       P-R-T Axes : 000 202 038 degrees     QTc Int : 656 ms      Wide QRS tachycardia  rate related  Right bundle branch block  Possible Right ventricular hypertrophy  Inferior infarct ,age undetermined  Anterolateral infarct ,age undetermined  Abnormal ECG  When compared with ECG of 19-OCT-2019 18:31,  qrs has widened  infarct paterns are new  Confirmed by LINDSEY KAUR MD ([id number]) on 10/20/2019 12:19:26 PM    Referred By: System System           Confirmed By:LINDSEY KAUR MD         2D ECHO:  No results found for this or any previous visit.      ASSESSMENT/PLAN:       Anesthesia Evaluation         Review of Systems      Physical Exam  General:  Well nourished, Obesity    Airway/Jaw/Neck:  Airway Findings: Mouth Opening: Normal Tongue: Normal  General Airway Assessment: Adult  Mallampati: III  Improves to II with phonation.  TM Distance: Normal, at least 6 cm  Jaw/Neck Findings:     Neck ROM: Normal ROM      Dental:  Dental Findings: In tact    Chest/Lungs:  Chest/Lungs Findings: Clear to auscultation, Normal Respiratory Rate         Mental Status:  Mental Status Findings:  Cooperative, Alert and Oriented         Anesthesia Plan  Type of Anesthesia, risks & benefits discussed:  Anesthesia Type:  general  Patient's Preference:   Intra-op Monitoring Plan: standard ASA monitors and arterial line  Intra-op Monitoring Plan Comments:   Post Op Pain Control Plan: multimodal analgesia and IV/PO Opioids PRN  Post Op Pain  Control Plan Comments:   Induction:   IV  Beta Blocker:         Informed Consent: Patient understands risks and agrees with Anesthesia plan.  Questions answered. Anesthesia consent signed with patient.  ASA Score: 4     Day of Surgery Review of History & Physical:    H&P update referred to the surgeon.         Ready For Surgery From Anesthesia Perspective.

## 2019-10-21 NOTE — ASSESSMENT & PLAN NOTE
- New leukocytosis of 14 on 10/20  - Likely due to stress reaction from multiple shocks  - UA negative for UTI, chest x-ray without lobar consolidation, afebrile

## 2019-10-21 NOTE — ASSESSMENT & PLAN NOTE
- Unable to obtain the events from the device interrogating (was cleared from prior interrogating)  - DDx TdP in the setting of having multiple medication could affect his QTc (anti psychotic, and SSRI); ischemic etiology; although non obstructive but diseases mLCX   - Continue on Amiodarone and Lidocaine current with repeated lab level of Lidocaine  - Patient to undergo VT ablation on 10/21  - symptomatic relief with xanax prn

## 2019-10-21 NOTE — TRANSFER OF CARE
"Anesthesia Transfer of Care Note    Patient: Lázaro Sarah    Procedure(s) Performed: Procedure(s) (LRB):  Ablation, Scar VT (N/A)  Cardioversion or Defibrillation    Patient location: ICU    Anesthesia Type: general    Transport from OR: Transported from OR on 6-10 L/min O2 by face mask with adequate spontaneous ventilation    Post pain: adequate analgesia    Post assessment: no apparent anesthetic complications and tolerated procedure well    Post vital signs: stable    Level of consciousness: awake and alert    Nausea/Vomiting: no nausea/vomiting    Complications: none    Transfer of care protocol was followed      Last vitals:   Visit Vitals  /75 (BP Location: Right arm, Patient Position: Lying)   Pulse 60   Temp 37 °C (98.6 °F) (Axillary)   Resp 20   Ht 5' 9" (1.753 m)   Wt 121.1 kg (267 lb)   SpO2 (!) 94%   BMI 39.43 kg/m²     "

## 2019-10-21 NOTE — CARE UPDATE
Care Update     - Serum Lidocaine level is 7.6 mcg/mL (Upper normal is 5.0 mcg/mL)  - Discontinue Lidocaine and start Mexiletine 150 mg PO TID     Noris Mittal MD  Cardiology Fellow (PGY-IV)  Pager: [phone]

## 2019-10-21 NOTE — ANESTHESIA PROCEDURE NOTES
Arterial    Diagnosis: Ventricular tachycardia  Doctor requesting consult: Dino    Patient location during procedure: done in OR  Procedure start time: 10/21/2019 12:12 PM  Timeout: 10/21/2019 12:12 PM  Procedure end time: 10/21/2019 12:13 PM    Staffing  Authorizing Provider: Danny Wells MD  Performing Provider: Danny Wells MD    Anesthesiologist was present at the time of the procedure.    Preanesthetic Checklist  Completed: patient identified, site marked, surgical consent, pre-op evaluation, timeout performed, IV checked, risks and benefits discussed, monitors and equipment checked and anesthesia consent givenArterial  Skin Prep: chlorhexidine gluconate  Local Infiltration: lidocaine  Orientation: right  Location: radial  Catheter Size: 20 G  Catheter placement by Anatomical landmarks. Heme positive aspiration all ports.Insertion Attempts: 1  Assessment  Dressing: secured with tape and tegaderm  Patient: Tolerated well

## 2019-10-21 NOTE — NURSING
Bedside echo done using Optison given by Dr. Carey.  Optison charted on MAR for 10/21/19 with note in comments field.

## 2019-10-21 NOTE — SUBJECTIVE & OBJECTIVE
Interval History: Patient had two additional shocks this morning. Currently just symptomatic management with prn xanax. Patient to undergo VT ablation today.    Review of Systems   Constitution: Positive for decreased appetite and malaise/fatigue. Negative for chills and night sweats.   HENT: Negative for congestion.    Cardiovascular: Positive for chest pain (discomfort). Negative for irregular heartbeat and leg swelling.   Respiratory: Negative for cough and shortness of breath.    Endocrine: Negative for cold intolerance and heat intolerance.   Skin: Negative for rash.   Musculoskeletal: Negative for arthritis and back pain.   Gastrointestinal: Negative for abdominal pain, constipation and diarrhea.   Genitourinary: Negative for dysuria and hematuria.   Neurological: Negative for dizziness and headaches.   Psychiatric/Behavioral: Negative for altered mental status. The patient is nervous/anxious.      Objective:     Vital Signs (Most Recent):  Temp: 98.6 °F (37 °C) (10/21/19 0701)  Pulse: 60 (10/21/19 0807)  Resp: (!) 27 (10/21/19 0807)  BP: 129/78 (10/21/19 0807)  SpO2: 95 % (10/21/19 0807) Vital Signs (24h Range):  Temp:  [97.6 °F (36.4 °C)-98.6 °F (37 °C)] 98.6 °F (37 °C)  Pulse:  [60-90] 60  Resp:  [15-38] 27  SpO2:  [93 %-99 %] 95 %  BP: (104-151)/(70-97) 129/78     Weight: 121.1 kg (267 lb)  Body mass index is 39.43 kg/m².     SpO2: 95 %  O2 Device (Oxygen Therapy): nasal cannula w/ humidification      Intake/Output Summary (Last 24 hours) at 10/21/2019 0910  Last data filed at 10/21/2019 0800  Gross per 24 hour   Intake 1298.48 ml   Output 1880 ml   Net -581.52 ml       Lines/Drains/Airways     Drain                 Urethral Catheter 10/19/19 2030 1 day          Peripheral Intravenous Line                 Peripheral IV - Single Lumen 10/17/19 18 G Anterior;Left Upper Arm 4 days         Peripheral IV - Single Lumen 10/19/19 1730 22 G Anterior;Left Hand 1 day                Physical Exam   Constitutional: He  is oriented to person, place, and time. He appears well-nourished. No distress.   HENT:   Head: Normocephalic.   Eyes: Pupils are equal, round, and reactive to light.   Neck: No JVD present. No thyromegaly present.   Cardiovascular: Normal rate and regular rhythm. Exam reveals no friction rub.   No murmur heard.  Pulmonary/Chest: Effort normal. No respiratory distress. He has no wheezes.   Abdominal: Soft. He exhibits no distension. There is no tenderness.   Musculoskeletal: He exhibits no edema.   Neurological: He is alert and oriented to person, place, and time.   Skin:        ICD St. Gabe    Vitals reviewed.      Significant Labs: All pertinent lab results from the last 24 hours have been reviewed.    Significant Imaging: Reviewed

## 2019-10-21 NOTE — NURSING TRANSFER
Nursing Transfer Note      10/21/2019     Transfer To: EP    Transfer via bed    Transfer with 5L NC to O2, cardiac monitoring    Transported by [facility] and Ep transport    Medicines sent: Lidocaine gtt, amiodarone gtt    Chart send with patient: Yes    Notified: pt and family     Patient reassessed at: [facility], present at bedside for assessment and handoff. (10/21/19, 1050)    Upon arrival to floor: cardiac monitor applied, patient oriented to room, call bell in reach and bed in lowest position

## 2019-10-21 NOTE — ANESTHESIA POSTPROCEDURE EVALUATION
Anesthesia Post Evaluation    Patient: Lázaro Sarah    Procedure(s) Performed: Procedure(s) (LRB):  Ablation, Scar VT (N/A)  Cardioversion or Defibrillation    Final Anesthesia Type: general  Patient location during evaluation: ICU  Patient participation: Yes- Able to Participate  Level of consciousness: awake and alert and oriented  Post-procedure vital signs: reviewed and stable  Pain management: adequate  Airway patency: patent  PONV status at discharge: No PONV  Anesthetic complications: no      Cardiovascular status: blood pressure returned to baseline, hemodynamically stable and stable  Respiratory status: unassisted, room air and spontaneous ventilation  Hydration status: euvolemic  Follow-up not needed.          Vitals Value Taken Time   /75 10/21/2019 10:01 AM   Temp 37 °C (98.6 °F) 10/21/2019  7:01 AM   Pulse 60 10/21/2019 10:24 AM   Resp 20 10/21/2019 10:24 AM   SpO2 96 % 10/21/2019 10:24 AM   Vitals shown include unvalidated device data.      No case tracking events are documented in the log.      Pain/Jared Score: No data recorded

## 2019-10-21 NOTE — PROGRESS NOTES
[facility]  Cardiology  Progress Note    Patient Name: Lázaro Sarah  MRN: [id number]  Admission Date: 10/18/2019  Hospital Length of Stay: 3 days  Code Status: Full Code   Attending Physician: Rea Davila MD   Primary Care Physician: Cameron Arteaga DO  Expected Discharge Date:   Principal Problem:Sustained ventricular tachycardia    Subjective:     Hospital Course:   Patient admitted to CCU on 10/18 with concerns of multiple AICD shocks for his VT storm. Patient had continued amiodarone and lidocaine infusion with plans to undergo ablation on 10/21/19. Hospital course so far complicated by multiple shocks leading to severe patient discomfort. Symptoms managed with prn Xanax.    Interval History: Patient had two additional shocks this morning. Currently just symptomatic management with prn xanax. Patient to undergo VT ablation today.    Review of Systems   Constitution: Positive for decreased appetite and malaise/fatigue. Negative for chills and night sweats.   HENT: Negative for congestion.    Cardiovascular: Positive for chest pain (discomfort). Negative for irregular heartbeat and leg swelling.   Respiratory: Negative for cough and shortness of breath.    Endocrine: Negative for cold intolerance and heat intolerance.   Skin: Negative for rash.   Musculoskeletal: Negative for arthritis and back pain.   Gastrointestinal: Negative for abdominal pain, constipation and diarrhea.   Genitourinary: Negative for dysuria and hematuria.   Neurological: Negative for dizziness and headaches.   Psychiatric/Behavioral: Negative for altered mental status. The patient is nervous/anxious.      Objective:     Vital Signs (Most Recent):  Temp: 98.6 °F (37 °C) (10/21/19 0701)  Pulse: 60 (10/21/19 0807)  Resp: (!) 27 (10/21/19 0807)  BP: 129/78 (10/21/19 0807)  SpO2: 95 % (10/21/19 0807) Vital Signs (24h Range):  Temp:  [97.6 °F (36.4 °C)-98.6 °F (37 °C)] 98.6 °F (37 °C)  Pulse:  [60-90] 60  Resp:   [15-38] 27  SpO2:  [93 %-99 %] 95 %  BP: (104-151)/(70-97) 129/78     Weight: 121.1 kg (267 lb)  Body mass index is 39.43 kg/m².     SpO2: 95 %  O2 Device (Oxygen Therapy): nasal cannula w/ humidification      Intake/Output Summary (Last 24 hours) at 10/21/2019 0910  Last data filed at 10/21/2019 0800  Gross per 24 hour   Intake 1298.48 ml   Output 1880 ml   Net -581.52 ml       Lines/Drains/Airways     Drain                 Urethral Catheter 10/19/19 2030 1 day          Peripheral Intravenous Line                 Peripheral IV - Single Lumen 10/17/19 18 G Anterior;Left Upper Arm 4 days         Peripheral IV - Single Lumen 10/19/19 1730 22 G Anterior;Left Hand 1 day                Physical Exam   Constitutional: He is oriented to person, place, and time. He appears well-nourished. No distress.   HENT:   Head: Normocephalic.   Eyes: Pupils are equal, round, and reactive to light.   Neck: No JVD present. No thyromegaly present.   Cardiovascular: Normal rate and regular rhythm. Exam reveals no friction rub.   No murmur heard.  Pulmonary/Chest: Effort normal. No respiratory distress. He has no wheezes.   Abdominal: Soft. He exhibits no distension. There is no tenderness.   Musculoskeletal: He exhibits no edema.   Neurological: He is alert and oriented to person, place, and time.   Skin:        ICD St. Gabe    Vitals reviewed.      Significant Labs: All pertinent lab results from the last 24 hours have been reviewed.    Significant Imaging: Reviewed    Assessment and Plan:       * Sustained ventricular tachycardia  - Unable to obtain the events from the device interrogating (was cleared from prior interrogating)  - DDx TdP in the setting of having multiple medication could affect his QTc (anti psychotic, and SSRI); ischemic etiology; although non obstructive but diseases mLCX   - Continue on Amiodarone and Lidocaine current with repeated lab level of Lidocaine  - Patient to undergo VT ablation on 10/21  - symptomatic  relief with xanax prn       Acute hypoxemic respiratory failure  - Low concern for infection in the setting of no CXR findings suggestive of lobar consolidation- Stable currently on supplementation oxygen with goal of O2 sat > 90%   - Known to have MEGAN and using CPAP QHS - Continue     MEGAN (obstructive sleep apnea)  - CPAP QHS PRN     Leukocytosis  - New leukocytosis of 14 on 10/20  - Likely due to stress reaction from multiple shocks  - UA negative for UTI, chest x-ray without lobar consolidation, afebrile    CAD (coronary artery disease)  - Recent LHC showed patent Radial-OM; LIMA-LAD; TOD to distal right disease to mLCX with collaterals to RCA. No intervention.   - Continue on ASA   - Lipid panel reviewed    Essential hypertension  - Continue on Coreg at lower dose and tentative to increase when stable     Diabetes mellitus with neurological manifestations  - Taking home insulin, will resume long and short acting insulin at lower doses     Acute on chronic systolic heart failure  - Acute exacerbation of heart failure could be from arrhythmia in the setting of having multiple V tach episodes, or from non-compliance with medication or diet restriction, less likely to be in ischemia in the absence of chest pain  - Prior TTE showed EF 15-20%with diastolic dysfunction.  - NYHA functional classification III, ACCF/AHA Stage of Heart Failure is C.  - Continue on GDMT with Coreg at lower dose and unable to have ACEi (allergy); will consider Hydralazine and nitrite when stable           VTE Risk Mitigation (From admission, onward)         Ordered     IP VTE LOW RISK PATIENT  Once      10/18/19 2135     Place sequential compression device  Until discontinued      10/18/19 2135                Neil Partida MD  Cardiology  [facility]

## 2019-10-21 NOTE — PLAN OF CARE
CMICU DAILY GOALS       A: Awake    RASS: Goal - RASS Goal: 0-->alert and calm  Actual - RASS (Lema Agitation-Sedation Scale): 0-->alert and calm   Restraint necessity:  no need  B: Breath   SBT: Not intubated   C: Coordinate A & B, analgesics/sedatives   Pain: managed    SAT: Not intubated  D: Delirium   CAM-ICU: Overall CAM-ICU: Negative  E: Early Mobility   MOVE Screen: Pass   Activity: Activity Management: activity adjusted per tolerance  FAS: Feeding/Nutrition   Diet order: Diet/Nutrition Received: NPO,   Fluid restriction:    T: Thrombus   DVT prophylaxis: VTE Required Core Measure: (SCDs) Sequential compression device initiated/maintained  H: HOB Elevation   Head of Bed (HOB): HOB at 20-30 degrees  U: Ulcer Prophylaxis   GI: yes  G: Glucose control   managed Glycemic Management: blood glucose monitoring  S: Skin   Bundle compliance: yes   Bathing/Skin Care: back care, bath, chlorhexidine, bath, complete, dressed/undressed, linen changed Date:   B: Bowel Function   no issues   I: Indwelling Catheters   Samson necessity:      CVC necessity: No   IPAD offered: No  D: De-escalation Antibx   Yes  Plan for the day   Plan for ablation today  Family/Goals of care/Code Status   Code Status: Full Code     Pt. Shocked twice by his defibrillator, MD notified, amio gtt  increased from 0.5 to 1 mg/min .VS and assessment per flow sheet, patient progressing towards goals as tolerated, plan of care reviewed with Lázaro Sarah , all concerns addressed, will continue to monitor.    Angelica Lanier

## 2019-10-22 PROBLEM — L30.8 DERMATITIS ASSOCIATED WITH MOISTURE: Status: ACTIVE | Noted: 2019-10-22

## 2019-10-22 LAB
ALBUMIN SERPL BCP-MCNC: 2.7 G/DL (ref 3.5–5.2)
ALP SERPL-CCNC: 127 U/L (ref 55–135)
ALT SERPL W/O P-5'-P-CCNC: 8 U/L (ref 10–44)
ANION GAP SERPL CALC-SCNC: 10 MMOL/L (ref 8–16)
ANION GAP SERPL CALC-SCNC: 12 MMOL/L (ref 8–16)
AST SERPL-CCNC: 31 U/L (ref 10–40)
BASOPHILS # BLD AUTO: 0.06 K/UL (ref 0–0.2)
BASOPHILS NFR BLD: 0.4 % (ref 0–1.9)
BILIRUB SERPL-MCNC: 0.3 MG/DL (ref 0.1–1)
BUN SERPL-MCNC: 31 MG/DL (ref 8–23)
BUN SERPL-MCNC: 34 MG/DL (ref 8–23)
CALCIUM SERPL-MCNC: 9.4 MG/DL (ref 8.7–10.5)
CALCIUM SERPL-MCNC: 9.5 MG/DL (ref 8.7–10.5)
CHLORIDE SERPL-SCNC: 87 MMOL/L (ref 95–110)
CHLORIDE SERPL-SCNC: 91 MMOL/L (ref 95–110)
CO2 SERPL-SCNC: 27 MMOL/L (ref 23–29)
CO2 SERPL-SCNC: 29 MMOL/L (ref 23–29)
CREAT SERPL-MCNC: 1.1 MG/DL (ref 0.5–1.4)
CREAT SERPL-MCNC: 1.4 MG/DL (ref 0.5–1.4)
DIFFERENTIAL METHOD: ABNORMAL
EOSINOPHIL # BLD AUTO: 0.1 K/UL (ref 0–0.5)
EOSINOPHIL NFR BLD: 0.4 % (ref 0–8)
ERYTHROCYTE [DISTWIDTH] IN BLOOD BY AUTOMATED COUNT: 12.3 % (ref 11.5–14.5)
EST. GFR  (AFRICAN AMERICAN): >60 ML/MIN/1.73 M^2
EST. GFR  (AFRICAN AMERICAN): >60 ML/MIN/1.73 M^2
EST. GFR  (NON AFRICAN AMERICAN): 52.7 ML/MIN/1.73 M^2
EST. GFR  (NON AFRICAN AMERICAN): >60 ML/MIN/1.73 M^2
GLUCOSE SERPL-MCNC: 242 MG/DL (ref 70–110)
GLUCOSE SERPL-MCNC: 297 MG/DL (ref 70–110)
HCT VFR BLD AUTO: 37.3 % (ref 40–54)
HGB BLD-MCNC: 12.2 G/DL (ref 14–18)
IMM GRANULOCYTES # BLD AUTO: 0.22 K/UL (ref 0–0.04)
IMM GRANULOCYTES NFR BLD AUTO: 1.4 % (ref 0–0.5)
LYMPHOCYTES # BLD AUTO: 1.6 K/UL (ref 1–4.8)
LYMPHOCYTES NFR BLD: 9.7 % (ref 18–48)
MAGNESIUM SERPL-MCNC: 2.7 MG/DL (ref 1.6–2.6)
MCH RBC QN AUTO: 28.8 PG (ref 27–31)
MCHC RBC AUTO-ENTMCNC: 32.7 G/DL (ref 32–36)
MCV RBC AUTO: 88 FL (ref 82–98)
MONOCYTES # BLD AUTO: 1.9 K/UL (ref 0.3–1)
MONOCYTES NFR BLD: 11.5 % (ref 4–15)
NEUTROPHILS # BLD AUTO: 12.3 K/UL (ref 1.8–7.7)
NEUTROPHILS NFR BLD: 76.6 % (ref 38–73)
NRBC BLD-RTO: 0 /100 WBC
PHOSPHATE SERPL-MCNC: 3 MG/DL (ref 2.7–4.5)
PLATELET # BLD AUTO: 438 K/UL (ref 150–350)
PMV BLD AUTO: 10.9 FL (ref 9.2–12.9)
POCT GLUCOSE: 180 MG/DL (ref 70–110)
POCT GLUCOSE: 217 MG/DL (ref 70–110)
POCT GLUCOSE: 275 MG/DL (ref 70–110)
POCT GLUCOSE: 284 MG/DL (ref 70–110)
POTASSIUM SERPL-SCNC: 3.5 MMOL/L (ref 3.5–5.1)
POTASSIUM SERPL-SCNC: 3.8 MMOL/L (ref 3.5–5.1)
PROT SERPL-MCNC: 6.6 G/DL (ref 6–8.4)
RBC # BLD AUTO: 4.24 M/UL (ref 4.6–6.2)
SODIUM SERPL-SCNC: 128 MMOL/L (ref 136–145)
SODIUM SERPL-SCNC: 128 MMOL/L (ref 136–145)
WBC # BLD AUTO: 16.11 K/UL (ref 3.9–12.7)

## 2019-10-22 PROCEDURE — 25000003 PHARM REV CODE 250: Performed by: STUDENT IN AN ORGANIZED HEALTH CARE EDUCATION/TRAINING PROGRAM

## 2019-10-22 PROCEDURE — 99232 SBSQ HOSP IP/OBS MODERATE 35: CPT | Mod: ,,, | Performed by: INTERNAL MEDICINE

## 2019-10-22 PROCEDURE — 63600175 PHARM REV CODE 636 W HCPCS: Performed by: STUDENT IN AN ORGANIZED HEALTH CARE EDUCATION/TRAINING PROGRAM

## 2019-10-22 PROCEDURE — 83735 ASSAY OF MAGNESIUM: CPT

## 2019-10-22 PROCEDURE — 85025 COMPLETE CBC W/AUTO DIFF WBC: CPT

## 2019-10-22 PROCEDURE — 80053 COMPREHEN METABOLIC PANEL: CPT

## 2019-10-22 PROCEDURE — 80176 ASSAY OF LIDOCAINE: CPT

## 2019-10-22 PROCEDURE — 20000000 HC ICU ROOM

## 2019-10-22 PROCEDURE — 99900035 HC TECH TIME PER 15 MIN (STAT)

## 2019-10-22 PROCEDURE — 99232 PR SUBSEQUENT HOSPITAL CARE,LEVL II: ICD-10-PCS | Mod: ,,, | Performed by: INTERNAL MEDICINE

## 2019-10-22 PROCEDURE — 84100 ASSAY OF PHOSPHORUS: CPT

## 2019-10-22 PROCEDURE — 94660 CPAP INITIATION&MGMT: CPT

## 2019-10-22 PROCEDURE — 99233 SBSQ HOSP IP/OBS HIGH 50: CPT | Mod: GC,,, | Performed by: INTERNAL MEDICINE

## 2019-10-22 PROCEDURE — 99233 PR SUBSEQUENT HOSPITAL CARE,LEVL III: ICD-10-PCS | Mod: GC,,, | Performed by: INTERNAL MEDICINE

## 2019-10-22 PROCEDURE — 99223 PR INITIAL HOSPITAL CARE,LEVL III: ICD-10-PCS | Mod: ,,, | Performed by: INTERNAL MEDICINE

## 2019-10-22 PROCEDURE — 27000221 HC OXYGEN, UP TO 24 HOURS

## 2019-10-22 PROCEDURE — 94761 N-INVAS EAR/PLS OXIMETRY MLT: CPT

## 2019-10-22 PROCEDURE — 80048 BASIC METABOLIC PNL TOTAL CA: CPT

## 2019-10-22 PROCEDURE — 99223 1ST HOSP IP/OBS HIGH 75: CPT | Mod: ,,, | Performed by: INTERNAL MEDICINE

## 2019-10-22 RX ORDER — POTASSIUM CHLORIDE 20 MEQ/1
40 TABLET, EXTENDED RELEASE ORAL ONCE
Status: COMPLETED | OUTPATIENT
Start: 2019-10-22 | End: 2019-10-22

## 2019-10-22 RX ORDER — SODIUM CHLORIDE 9 MG/ML
INJECTION, SOLUTION INTRAVENOUS CONTINUOUS
Status: ACTIVE | OUTPATIENT
Start: 2019-10-22 | End: 2019-10-23

## 2019-10-22 RX ORDER — CARVEDILOL 25 MG/1
25 TABLET ORAL 2 TIMES DAILY
Status: DISCONTINUED | OUTPATIENT
Start: 2019-10-22 | End: 2019-10-22

## 2019-10-22 RX ADMIN — MICONAZOLE NITRATE: 20 OINTMENT TOPICAL at 12:10

## 2019-10-22 RX ADMIN — GABAPENTIN 300 MG: 300 CAPSULE ORAL at 09:10

## 2019-10-22 RX ADMIN — METOPROLOL SUCCINATE 75 MG: 25 TABLET, EXTENDED RELEASE ORAL at 08:10

## 2019-10-22 RX ADMIN — AMIODARONE HYDROCHLORIDE 1 MG/MIN: 1.8 INJECTION, SOLUTION INTRAVENOUS at 03:10

## 2019-10-22 RX ADMIN — INSULIN ASPART 3 UNITS: 100 INJECTION, SOLUTION INTRAVENOUS; SUBCUTANEOUS at 06:10

## 2019-10-22 RX ADMIN — INSULIN ASPART 6 UNITS: 100 INJECTION, SOLUTION INTRAVENOUS; SUBCUTANEOUS at 08:10

## 2019-10-22 RX ADMIN — ALPRAZOLAM 2 MG: 0.5 TABLET ORAL at 02:10

## 2019-10-22 RX ADMIN — MICONAZOLE NITRATE: 20 OINTMENT TOPICAL at 09:10

## 2019-10-22 RX ADMIN — DORZOLAMIDE HYDROCHLORIDE 1 DROP: 20 SOLUTION/ DROPS OPHTHALMIC at 09:10

## 2019-10-22 RX ADMIN — AMIODARONE HYDROCHLORIDE 1 MG/MIN: 1.8 INJECTION, SOLUTION INTRAVENOUS at 09:10

## 2019-10-22 RX ADMIN — HYDRALAZINE HYDROCHLORIDE AND ISOSORBIDE DINITRATE 1 TABLET: 37.5; 2 TABLET, FILM COATED ORAL at 08:10

## 2019-10-22 RX ADMIN — INSULIN ASPART 4 UNITS: 100 INJECTION, SOLUTION INTRAVENOUS; SUBCUTANEOUS at 12:10

## 2019-10-22 RX ADMIN — MEXILETINE HYDROCHLORIDE 150 MG: 150 CAPSULE ORAL at 05:10

## 2019-10-22 RX ADMIN — INSULIN ASPART 2 UNITS: 100 INJECTION, SOLUTION INTRAVENOUS; SUBCUTANEOUS at 06:10

## 2019-10-22 RX ADMIN — DEXMEDETOMIDINE HYDROCHLORIDE 0.2 MCG/KG/HR: 4 INJECTION, SOLUTION INTRAVENOUS at 02:10

## 2019-10-22 RX ADMIN — TIMOLOL MALEATE 1 DROP: 5 SOLUTION OPHTHALMIC at 09:10

## 2019-10-22 RX ADMIN — DORZOLAMIDE HYDROCHLORIDE 1 DROP: 20 SOLUTION/ DROPS OPHTHALMIC at 08:10

## 2019-10-22 RX ADMIN — POTASSIUM CHLORIDE 40 MEQ: 1500 TABLET, EXTENDED RELEASE ORAL at 05:10

## 2019-10-22 RX ADMIN — LIDOCAINE HYDROCHLORIDE 1 MG/MIN: 8 INJECTION, SOLUTION INTRAVENOUS at 03:10

## 2019-10-22 RX ADMIN — INSULIN ASPART 3 UNITS: 100 INJECTION, SOLUTION INTRAVENOUS; SUBCUTANEOUS at 12:10

## 2019-10-22 RX ADMIN — SODIUM CHLORIDE 500 ML: 0.9 INJECTION, SOLUTION INTRAVENOUS at 03:10

## 2019-10-22 RX ADMIN — TIMOLOL MALEATE 1 DROP: 5 SOLUTION OPHTHALMIC at 08:10

## 2019-10-22 RX ADMIN — ASPIRIN 81 MG: 81 TABLET, COATED ORAL at 08:10

## 2019-10-22 RX ADMIN — HYDRALAZINE HYDROCHLORIDE AND ISOSORBIDE DINITRATE 1 TABLET: 37.5; 2 TABLET, FILM COATED ORAL at 02:10

## 2019-10-22 RX ADMIN — SODIUM CHLORIDE: 0.9 INJECTION, SOLUTION INTRAVENOUS at 02:10

## 2019-10-22 NOTE — PROGRESS NOTES
"      Wound care consult for buttocks area.     PMH: multiple AICD shocks for his VT storm    Patient with dermatitis rash to the groins and buttocks. Patient states he has frerquent "jock itch".  Rash appears fungal in nature.     Recommend a barrier antifungal cream BID to the area.   Discussed with Dr Venegas and recs approved    Patient is able to turn with minimal assitance.     Wound care to sign off. Please reconsult if we can be of further assistance.   Juany Francisco RN [facility]   [phone]             10/22/19 0902        Wound 10/22/19 0900 Moisture associated dermatitis Perineum   Date First Assessed/Time First Assessed: 10/22/19 0900   Pre-existing: No  Primary Wound Type: Moisture associated dermatitis  Location: Perineum   Wound Image     Wound WDL ex   Dressing Appearance Open to air   Drainage Amount None     "

## 2019-10-22 NOTE — SUBJECTIVE & OBJECTIVE
Past Medical History:   Diagnosis Date    Acute on chronic systolic heart failure 10/16/2019    CAD (coronary artery disease)     Cancer     Skin Cancer     CHF (congestive heart failure)     Chronic rhinitis 10/4/2016    Depression 5/25/2016    Diabetes mellitus     Encounter for blood transfusion     during pt open heart surgery 1998    History of hypertension 10/4/2016    Hypertension     Implantable cardioverter-defibrillator (ICD) generator end of life 5/18/2018    Leukocytosis 10/16/2019       Past Surgical History:   Procedure Laterality Date    ABDOMINAL SURGERY      hernia repair    CARDIAC SURGERY      open heart    EYE SURGERY      HERNIA REPAIR      SKIN BIOPSY         Review of patient's allergies indicates:   Allergen Reactions    Ace inhibitors Swelling    Adhesive Hives    Novolin 70/30 (semi-synthetic) Hives    Codeine Nausea And Vomiting    Statins-hmg-coa reductase inhibitors Swelling    Bupropion hcl Rash       Current Facility-Administered Medications   Medication    0.9%  NaCl infusion    ALPRAZolam tablet 1 mg    ALPRAZolam tablet 2 mg    amiodarone 360 mg/200 mL (1.8 mg/mL) infusion    aspirin EC tablet 81 mg    ceFAZolin injection 2 g    dexmedetomidine (PRECEDEX) 400mcg/100mL 0.9% NaCL infusion    dextrose 10% (D10W) Bolus    dextrose 10% (D10W) Bolus    dextrose 10% (D10W) Bolus    dextrose 10% (D10W) Bolus    diphenhydrAMINE-zinc acetate 2-0.1% cream    dorzolamide 2 % ophthalmic solution 1 drop    gabapentin capsule 300 mg    glucagon (human recombinant) injection 1 mg    glucagon (human recombinant) injection 1 mg    glucose chewable tablet 16 g    glucose chewable tablet 24 g    heparin infusion 1,000 units/500 ml in 0.9% NaCl (pressure line flush)    hydrOXYzine pamoate capsule 50 mg    insulin aspart U-100 pen 1-10 Units    insulin aspart U-100 pen 3 Units    isosorbide-hydrALAZINE 20-37.5 mg per tablet 1 tablet    lidocaine 2000 mg in  "dextrose 5% 250 mL infusion    lidocaine HCL 20 mg/ml (2%) injection    metoprolol succinate 24 hr tablet 75 mg    miconazole nitrate 2% ointment    sodium chloride 0.9% flush 10 mL    sodium chloride 0.9% flush 10 mL    timolol maleate 0.5% ophthalmic solution 1 drop     Family History     None        Tobacco Use    Smoking status: Former Smoker     Types: Cigarettes     Last attempt to quit: 3/18/2006     Years since quittin.6   Substance and Sexual Activity    Alcohol use: Yes     Comment: per pt "once in a while"    Drug use: Yes     Comment: per pt "Carri on Percocet 2 yrs ago in July"    Sexual activity: Not Currently     Review of Systems   HENT: Negative for congestion.    Respiratory: Positive for shortness of breath. Negative for chest tightness.    Cardiovascular: Negative for palpitations and leg swelling.   Gastrointestinal: Negative for constipation, diarrhea, nausea and vomiting.   Neurological: Negative for dizziness, light-headedness, numbness and headaches.   Psychiatric/Behavioral: Negative for confusion.     Objective:     Vital Signs (Most Recent):  Temp: 97 °F (36.1 °C) (10/22/19 1100)  Pulse: 60 (10/22/19 1338)  Resp: 19 (10/22/19 1338)  BP: (!) 97/55 (10/22/19 1300)  SpO2: 95 % (10/22/19 1338) Vital Signs (24h Range):  Temp:  [97 °F (36.1 °C)-99.5 °F (37.5 °C)] 97 °F (36.1 °C)  Pulse:  [] 60  Resp:  [17-28] 19  SpO2:  [91 %-98 %] 95 %  BP: ()/(51-62) 97/55  Arterial Line BP: ()/(51-79) 120/64     Patient Vitals for the past 72 hrs (Last 3 readings):   Weight   10/21/19 0701 121.1 kg (267 lb)   10/19/19 1700 121.1 kg (267 lb)     Body mass index is 39.43 kg/m².      Intake/Output Summary (Last 24 hours) at 10/22/2019 1517  Last data filed at 10/22/2019 1300  Gross per 24 hour   Intake 1543.68 ml   Output 1759 ml   Net -215.32 ml       Physical Exam   Constitutional: He appears well-developed and well-nourished.   HENT:   Mouth/Throat: Oropharynx is clear and " moist.   Eyes: Pupils are equal, round, and reactive to light.   Neck: Neck supple.   Cardiovascular: Normal rate.   Pulmonary/Chest: Effort normal and breath sounds normal.   Abdominal: Soft. Bowel sounds are normal.   Neurological:   drowsy   Skin: Skin is warm.   Psychiatric: He has a normal mood and affect. His behavior is normal.       Significant Labs:  CBC:  Recent Labs   Lab 10/20/19  0313 10/21/19  0319 10/22/19  0300   WBC 14.37* 14.80* 16.11*   RBC 4.60 4.53* 4.24*   HGB 13.3* 12.8* 12.2*   HCT 39.1* 39.4* 37.3*   * 473* 438*   MCV 85 87 88   MCH 28.9 28.3 28.8   MCHC 34.0 32.5 32.7     BNP:  Recent Labs   Lab 10/18/19  2151   *     CMP:  Recent Labs   Lab 10/20/19  0313  10/21/19  0319 10/21/19  2048 10/22/19  0300 10/22/19  1355   *  --  234* 281* 297* 242*   CALCIUM 9.6  --  9.8 9.7 9.5 9.4   ALBUMIN 3.0*  --  2.9*  --  2.7*  --    PROT 7.3  --  7.0  --  6.6  --    *  --  129* 127* 128* 128*   K 3.6   < > 3.6 3.7 3.5 3.8   CO2 27  --  27 23 29 27   CL 87*  --  89* 88* 87* 91*   BUN 56*  --  41* 33* 34* 31*   CREATININE 1.6*  --  1.3 1.3 1.4 1.1   ALKPHOS 130  --  133  --  127  --    ALT 10  --  9*  --  8*  --    AST 12  --  10  --  31  --    BILITOT 0.3  --  0.4  --  0.3  --     < > = values in this interval not displayed.      Coagulation:   Recent Labs   Lab 10/18/19  2321   INR 1.3*   APTT 28.0     LDH:  No results for input(s): LDH in the last 72 hours.  Microbiology:  Microbiology Results (last 7 days)     ** No results found for the last 168 hours. **          I have reviewed all pertinent labs within the past 24 hours.    Diagnostic Results:  I have reviewed and interpreted all pertinent imaging results/findings within the past 24 hours.

## 2019-10-22 NOTE — ASSESSMENT & PLAN NOTE
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 Patient had recent LHC that did not show areas ameable to revascularization. He is s/p VT ablation but still having refractory VT despite also being on IV amiodarone and lidocaine. HTS consulted for evaluation for advanced cardiac options.

## 2019-10-22 NOTE — CONSULTS
[facility]  Heart Transplant  Consult Note    Patient Name: Lázaro Sarah  MRN: [id number]  Admission Date: 10/18/2019  Hospital Length of Stay: 4 days  Attending Physician: Rea Davila MD  Primary Care Provider: Cameron Arteaga DO   Principal Problem:Sustained ventricular tachycardia    Inpatient consult to Heart Transplant  Consult performed by: Marbella Parada MD  Consult ordered by: Nereida Venegas MD  Reason for consult: evaluation for advanced cardiac options         Subjective:     History of Present Illness: 63 year old male with PMHx significant for ischemic CMP (LVEF 15-20%) s/p AICD, depression (on SSRI/antipsychotics), obesity, insulin dependent diabetes with peripheral neuropathy, umbical hernia s/p repair, right ear basal cell carcinoma s/p excision, right retinal detachment and CAD s/p CABG 3/2008 (Radial-OM, LIMA to LAD and TOD to dRCA) who presented to [facility] as a transfer from OSH on 10/18/19 due to VT storm. Patient was shocked multiple times from his AICD for MMVT at 160 bpm. He was initially treated with amiodarone and lidocaine infusions. His VT stabilized and then was switched from lidocaine to mexiletine. He underwent a LHC that showed patent Radial-OM; LIMA-LAD; TOD to distal right diseased with collaterals to RCA and 90% stenosis to mLCX. Ventriculogram showed LVEF 10-15%. No interventions occurred. He also underwent RHC with reported normal values. Of note, this was done at OSH where pt initially presented on 10/16/19.     On 10/17/2019, he developed recurrent episodes wide complex tachycardia. His ICD was re-programmed to detect slower VT rate of 120 bpm given slow VT with a rate of 120-150 bpm with variable morphologies.     After being transferred to [facility], patient has gone into VT storm again. TTE was done that showed LVEF 23%, no apical thrombus noted, akinesis of apex and septal wall and trivial posterior pericardial effusion. It was noted he had  various morphologies of MMVT suggestive of numerous areas of foci for scar-related VT. [Morphologies include: (1) RBBB positive concordance, right/superior axis TCL ~400, (2) LBBB, negative concordance, left/superior axis, TCL ~ 420 (3) RBBB positive  concordance right/inferior axis ~ 470 (4) RBBB, changes axis at V3 small rQS in V1 suggestive of epicardiac foci]. EP was consulted and performed VT ablation for 4 of the 6 VT mapped. Currently patient is on lidocaine drip at 1 mg/hr (reduced given elevated lidocaine level) and amiodarone drip at 1 mg/min. He is also slightly sedated with precedex. HTS is being consulted for advanced cardiac options evaluation. CTS is being consulted as well for sympathectomy given incessant VT.     Past Medical History:   Diagnosis Date    Acute on chronic systolic heart failure 10/16/2019    CAD (coronary artery disease)     Cancer     Skin Cancer     CHF (congestive heart failure)     Chronic rhinitis 10/4/2016    Depression 5/25/2016    Diabetes mellitus     Encounter for blood transfusion     during pt open heart surgery 1998    History of hypertension 10/4/2016    Hypertension     Implantable cardioverter-defibrillator (ICD) generator end of life 5/18/2018    Leukocytosis 10/16/2019       Past Surgical History:   Procedure Laterality Date    ABDOMINAL SURGERY      hernia repair    CARDIAC SURGERY      open heart    EYE SURGERY      HERNIA REPAIR      SKIN BIOPSY         Review of patient's allergies indicates:   Allergen Reactions    Ace inhibitors Swelling    Adhesive Hives    Novolin 70/30 (semi-synthetic) Hives    Codeine Nausea And Vomiting    Statins-hmg-coa reductase inhibitors Swelling    Bupropion hcl Rash       Current Facility-Administered Medications   Medication    0.9%  NaCl infusion    ALPRAZolam tablet 1 mg    ALPRAZolam tablet 2 mg    amiodarone 360 mg/200 mL (1.8 mg/mL) infusion    aspirin EC tablet 81 mg    ceFAZolin injection 2 g  "   dexmedetomidine (PRECEDEX) 400mcg/100mL 0.9% NaCL infusion    dextrose 10% (D10W) Bolus    dextrose 10% (D10W) Bolus    dextrose 10% (D10W) Bolus    dextrose 10% (D10W) Bolus    diphenhydrAMINE-zinc acetate 2-0.1% cream    dorzolamide 2 % ophthalmic solution 1 drop    gabapentin capsule 300 mg    glucagon (human recombinant) injection 1 mg    glucagon (human recombinant) injection 1 mg    glucose chewable tablet 16 g    glucose chewable tablet 24 g    heparin infusion 1,000 units/500 ml in 0.9% NaCl (pressure line flush)    hydrOXYzine pamoate capsule 50 mg    insulin aspart U-100 pen 1-10 Units    insulin aspart U-100 pen 3 Units    isosorbide-hydrALAZINE 20-37.5 mg per tablet 1 tablet    lidocaine 2000 mg in dextrose 5% 250 mL infusion    lidocaine HCL 20 mg/ml (2%) injection    metoprolol succinate 24 hr tablet 75 mg    miconazole nitrate 2% ointment    sodium chloride 0.9% flush 10 mL    sodium chloride 0.9% flush 10 mL    timolol maleate 0.5% ophthalmic solution 1 drop     Family History     None        Tobacco Use    Smoking status: Former Smoker     Types: Cigarettes     Last attempt to quit: 3/18/2006     Years since quittin.6   Substance and Sexual Activity    Alcohol use: Yes     Comment: per pt "once in a while"    Drug use: Yes     Comment: per pt "Carri on Percocet 2 yrs ago in July"    Sexual activity: Not Currently     Review of Systems   HENT: Negative for congestion.    Respiratory: Positive for shortness of breath. Negative for chest tightness.    Cardiovascular: Negative for palpitations and leg swelling.   Gastrointestinal: Negative for constipation, diarrhea, nausea and vomiting.   Neurological: Negative for dizziness, light-headedness, numbness and headaches.   Psychiatric/Behavioral: Negative for confusion.     Objective:     Vital Signs (Most Recent):  Temp: 97 °F (36.1 °C) (10/22/19 1100)  Pulse: 60 (10/22/19 1338)  Resp: 19 (10/22/19 1338)  BP: (!) 97/55 " (10/22/19 1300)  SpO2: 95 % (10/22/19 1338) Vital Signs (24h Range):  Temp:  [97 °F (36.1 °C)-99.5 °F (37.5 °C)] 97 °F (36.1 °C)  Pulse:  [] 60  Resp:  [17-28] 19  SpO2:  [91 %-98 %] 95 %  BP: ()/(51-62) 97/55  Arterial Line BP: ()/(51-79) 120/64     Patient Vitals for the past 72 hrs (Last 3 readings):   Weight   10/21/19 0701 121.1 kg (267 lb)   10/19/19 1700 121.1 kg (267 lb)     Body mass index is 39.43 kg/m².      Intake/Output Summary (Last 24 hours) at 10/22/2019 1517  Last data filed at 10/22/2019 1300  Gross per 24 hour   Intake 1543.68 ml   Output 1759 ml   Net -215.32 ml       Physical Exam   Constitutional: He appears well-developed and well-nourished.   HENT:   Mouth/Throat: Oropharynx is clear and moist.   Eyes: Pupils are equal, round, and reactive to light.   Neck: Neck supple.   Cardiovascular: Normal rate.   Pulmonary/Chest: Effort normal and breath sounds normal.   Abdominal: Soft. Bowel sounds are normal.   Neurological:   drowsy   Skin: Skin is warm.   Psychiatric: He has a normal mood and affect. His behavior is normal.       Significant Labs:  CBC:  Recent Labs   Lab 10/20/19  0313 10/21/19  0319 10/22/19  0300   WBC 14.37* 14.80* 16.11*   RBC 4.60 4.53* 4.24*   HGB 13.3* 12.8* 12.2*   HCT 39.1* 39.4* 37.3*   * 473* 438*   MCV 85 87 88   MCH 28.9 28.3 28.8   MCHC 34.0 32.5 32.7     BNP:  Recent Labs   Lab 10/18/19  2151   *     CMP:  Recent Labs   Lab 10/20/19  0313  10/21/19  0319 10/21/19  2048 10/22/19  0300 10/22/19  1355   *  --  234* 281* 297* 242*   CALCIUM 9.6  --  9.8 9.7 9.5 9.4   ALBUMIN 3.0*  --  2.9*  --  2.7*  --    PROT 7.3  --  7.0  --  6.6  --    *  --  129* 127* 128* 128*   K 3.6   < > 3.6 3.7 3.5 3.8   CO2 27  --  27 23 29 27   CL 87*  --  89* 88* 87* 91*   BUN 56*  --  41* 33* 34* 31*   CREATININE 1.6*  --  1.3 1.3 1.4 1.1   ALKPHOS 130  --  133  --  127  --    ALT 10  --  9*  --  8*  --    AST 12  --  10  --  31  --    BILITOT  0.3  --  0.4  --  0.3  --     < > = values in this interval not displayed.      Coagulation:   Recent Labs   Lab 10/18/19  2321   INR 1.3*   APTT 28.0     LDH:  No results for input(s): LDH in the last 72 hours.  Microbiology:  Microbiology Results (last 7 days)     ** No results found for the last 168 hours. **          I have reviewed all pertinent labs within the past 24 hours.    Diagnostic Results:  I have reviewed and interpreted all pertinent imaging results/findings within the past 24 hours.    Assessment/Plan:     * Sustained ventricular tachycardia  -MMVT likely due to large myocardial scar tissue from prior infarcts. Patient had recent LHC that did not show areas ameable to revascularization. He is s/p VT ablation but still having refractory VT despite also being on IV amiodarone and lidocaine. HTS consulted for evaluation for advanced cardiac options.   -Recommend to obtain non contrast CT scan of the chest/abdomen/pelvis to determine if there is aortic calcifications. Will continue to follow patient along and further recommendations to follow.     Thank you for your consult.     Marbella Parada MD  Heart Transplant  [facility]

## 2019-10-22 NOTE — SUBJECTIVE & OBJECTIVE
Interval History: Patient underwent VT ablation on 10/21, however continued to have shocks today. Currently on precedex drip. Will discuss advance options with heart failure and CTS.      Review of Systems   Constitution: Positive for decreased appetite and malaise/fatigue. Negative for chills and night sweats.   HENT: Negative for congestion.    Cardiovascular: Positive for chest pain (discomfort). Negative for irregular heartbeat and leg swelling.   Respiratory: Negative for cough and shortness of breath.    Endocrine: Negative for cold intolerance and heat intolerance.   Skin: Negative for rash.   Musculoskeletal: Negative for arthritis and back pain.   Gastrointestinal: Negative for abdominal pain, constipation and diarrhea.   Genitourinary: Negative for dysuria and hematuria.   Neurological: Negative for dizziness and headaches.   Psychiatric/Behavioral: Negative for altered mental status. The patient is nervous/anxious.      Objective:     Vital Signs (Most Recent):  Temp: 97 °F (36.1 °C) (10/22/19 1100)  Pulse: 60 (10/22/19 1338)  Resp: 19 (10/22/19 1338)  BP: (!) 97/55 (10/22/19 1300)  SpO2: 95 % (10/22/19 1338) Vital Signs (24h Range):  Temp:  [97 °F (36.1 °C)-99.5 °F (37.5 °C)] 97 °F (36.1 °C)  Pulse:  [] 60  Resp:  [17-28] 19  SpO2:  [91 %-98 %] 95 %  BP: ()/(51-62) 97/55  Arterial Line BP: ()/(51-79) 120/64     Weight: 121.1 kg (267 lb)  Body mass index is 39.43 kg/m².     SpO2: 95 %  O2 Device (Oxygen Therapy): nasal cannula      Intake/Output Summary (Last 24 hours) at 10/22/2019 1514  Last data filed at 10/22/2019 1300  Gross per 24 hour   Intake 1543.68 ml   Output 1759 ml   Net -215.32 ml       Lines/Drains/Airways     Drain                 Urethral Catheter 10/19/19 2030 2 days          Arterial Line                 Arterial Line 10/21/19 1215 Right Radial 1 day          Peripheral Intravenous Line                 Peripheral IV - Single Lumen 10/19/19 1730 22 G Anterior;Left Hand  2 days         Peripheral IV - Single Lumen 10/21/19 1250 20 G Left Forearm 1 day         Peripheral IV - Single Lumen 10/21/19 1300 18 G Right Hand 1 day         Peripheral IV - Single Lumen 10/21/19 2222 20 G Anterior;Proximal;Right Forearm less than 1 day                Physical Exam   Constitutional: He is oriented to person, place, and time. He appears well-nourished. No distress.   HENT:   Head: Normocephalic.   Eyes: Pupils are equal, round, and reactive to light.   Neck: No JVD present. No thyromegaly present.   Cardiovascular: Normal rate and regular rhythm. Exam reveals no friction rub.   No murmur heard.  Pulmonary/Chest: Effort normal. No respiratory distress. He has no wheezes.   Abdominal: Soft. He exhibits no distension. There is no tenderness.   Musculoskeletal: He exhibits no edema.   Neurological: He is alert and oriented to person, place, and time.   Skin:        ICD [employer] Gabe    Vitals reviewed.      Significant Labs: All pertinent lab results from the last 24 hours have been reviewed.    Significant Imaging: Reviewed

## 2019-10-22 NOTE — PROGRESS NOTES
Dr. Mittal still at bedside. 0.5mg Ativan given to patient prior to cardioversion. Patient remains asymptomatic. Will continue to monitor.

## 2019-10-22 NOTE — PROGRESS NOTES
[facility]  Cardiology  Progress Note    Patient Name: Lázaro Sarah  MRN: [id number]  Admission Date: 10/18/2019  Hospital Length of Stay: 4 days  Code Status: Full Code   Attending Physician: Rea Davila MD   Primary Care Physician: Cameron Arteaga DO  Expected Discharge Date: 10/24/2019  Principal Problem:Sustained ventricular tachycardia    Subjective:     Hospital Course:   Patient admitted to CCU on 10/18 with concerns of multiple AICD shocks for his VT storm. Patient had continued amiodarone and lidocaine infusion with plans to undergo ablation on 10/21/19. Hospital course so far complicated by multiple shocks leading to severe patient discomfort. Symptoms managed with prn Xanax. Patient underwent ablation procedure on 10/21. He had 6 foci, 4 of which were ablated. Despite this, patient continued to have his AICD fire for vtach. Discussing with CTS and heart failure regarding advanced options including sympathectomy or heart transplant.      Interval History: Patient underwent VT ablation on 10/21, however continued to have shocks today. Currently on precedex drip. Will discuss advance options with heart failure and CTS.      Review of Systems   Constitution: Positive for decreased appetite and malaise/fatigue. Negative for chills and night sweats.   HENT: Negative for congestion.    Cardiovascular: Positive for chest pain (discomfort). Negative for irregular heartbeat and leg swelling.   Respiratory: Negative for cough and shortness of breath.    Endocrine: Negative for cold intolerance and heat intolerance.   Skin: Negative for rash.   Musculoskeletal: Negative for arthritis and back pain.   Gastrointestinal: Negative for abdominal pain, constipation and diarrhea.   Genitourinary: Negative for dysuria and hematuria.   Neurological: Negative for dizziness and headaches.   Psychiatric/Behavioral: Negative for altered mental status. The patient is nervous/anxious.      Objective:      Vital Signs (Most Recent):  Temp: 97 °F (36.1 °C) (10/22/19 1100)  Pulse: 60 (10/22/19 1338)  Resp: 19 (10/22/19 1338)  BP: (!) 97/55 (10/22/19 1300)  SpO2: 95 % (10/22/19 1338) Vital Signs (24h Range):  Temp:  [97 °F (36.1 °C)-99.5 °F (37.5 °C)] 97 °F (36.1 °C)  Pulse:  [] 60  Resp:  [17-28] 19  SpO2:  [91 %-98 %] 95 %  BP: ()/(51-62) 97/55  Arterial Line BP: ()/(51-79) 120/64     Weight: 121.1 kg (267 lb)  Body mass index is 39.43 kg/m².     SpO2: 95 %  O2 Device (Oxygen Therapy): nasal cannula      Intake/Output Summary (Last 24 hours) at 10/22/2019 1514  Last data filed at 10/22/2019 1300  Gross per 24 hour   Intake 1543.68 ml   Output 1759 ml   Net -215.32 ml       Lines/Drains/Airways     Drain                 Urethral Catheter 10/19/19 2030 2 days          Arterial Line                 Arterial Line 10/21/19 1215 Right Radial 1 day          Peripheral Intravenous Line                 Peripheral IV - Single Lumen 10/19/19 1730 22 G Anterior;Left Hand 2 days         Peripheral IV - Single Lumen 10/21/19 1250 20 G Left Forearm 1 day         Peripheral IV - Single Lumen 10/21/19 1300 18 G Right Hand 1 day         Peripheral IV - Single Lumen 10/21/19 2222 20 G Anterior;Proximal;Right Forearm less than 1 day                Physical Exam   Constitutional: He is oriented to person, place, and time. He appears well-nourished. No distress.   HENT:   Head: Normocephalic.   Eyes: Pupils are equal, round, and reactive to light.   Neck: No JVD present. No thyromegaly present.   Cardiovascular: Normal rate and regular rhythm. Exam reveals no friction rub.   No murmur heard.  Pulmonary/Chest: Effort normal. No respiratory distress. He has no wheezes.   Abdominal: Soft. He exhibits no distension. There is no tenderness.   Musculoskeletal: He exhibits no edema.   Neurological: He is alert and oriented to person, place, and time.   Skin:        ICD [employer] Gabe    Vitals reviewed.      Significant Labs: All  pertinent lab results from the last 24 hours have been reviewed.    Significant Imaging: Reviewed    Assessment and Plan:       * Sustained ventricular tachycardia  - Unable to obtain the events from the device interrogating (was cleared from prior interrogating)  - DDx TdP in the setting of having multiple medication could affect his QTc (anti psychotic, and SSRI); ischemic etiology; although non obstructive but diseases mLCX   - Continue on Amiodarone and Lidocaine current with repeated lab level of Lidocaine  - patient underwent VT ablation on 10/21, however patient continues to have VT  - CTS and heart failure consulted for advance options  - carvedilol switched to metoprolol      Acute hypoxemic respiratory failure  - Low concern for infection in the setting of no CXR findings suggestive of lobar consolidation- Stable currently on supplementation oxygen with goal of O2 sat > 90%   - Known to have MEGAN and using CPAP QHS - Continue     MEGAN (obstructive sleep apnea)  - CPAP QHS PRN     Leukocytosis  - New leukocytosis of 14 on 10/20  - Likely due to stress reaction from multiple shocks  - UA negative for UTI, chest x-ray without lobar consolidation, afebrile    CAD (coronary artery disease)  - Recent LHC showed patent Radial-OM; LIMA-LAD; TOD to distal right disease to mLCX with collaterals to RCA. No intervention.   - Continue on ASA   - Lipid panel reviewed    Essential hypertension  - Continue on Coreg at lower dose and tentative to increase when stable     Diabetes mellitus with neurological manifestations  - Taking home insulin, will resume long and short acting insulin at lower doses     Acute on chronic systolic heart failure  - Acute exacerbation of heart failure could be from arrhythmia in the setting of having multiple V tach episodes, or from non-compliance with medication or diet restriction, less likely to be in ischemia in the absence of chest pain  - Prior TTE showed EF 15-20%with diastolic  dysfunction.  - NYHA functional classification III, ACCF/AHA Stage of Heart Failure is C.  - Continue on GDMT with Coreg at lower dose and unable to have ACEi (allergy); will consider Hydralazine and nitrite when stable           VTE Risk Mitigation (From admission, onward)         Ordered     heparin infusion 1,000 units/500 ml in 0.9% NaCl (pressure line flush)  Intra-op continuous PRN      10/21/19 1412     IP VTE LOW RISK PATIENT  Once      10/18/19 2135     Place sequential compression device  Until discontinued      10/18/19 2135                Neil Partida MD  Cardiology  [facility]

## 2019-10-22 NOTE — CARE UPDATE
Care Update     - Two episodes of V Tach; one delivered AICD shock and shortly after that, had another episode of V Tach, while interrogating the device he spontaneously received ATP therapy.   - Bolus 150 mg IV amiodarone   - Continue Mexiletin 150 mg PO TID and resume Lidocaine at 1 mg with no bolus   - Discussed with EP team and primary team

## 2019-10-22 NOTE — PLAN OF CARE
CMICU DAILY GOALS       A: Awake    RASS: Goal - RASS Goal: 0-->alert and calm  Actual - RASS (Lema Agitation-Sedation Scale): -1-->drowsy   Restraint necessity:    B: Breath   SBT: Not intubated   C: Coordinate A & B, analgesics/sedatives   Pain: managed    SAT: Not intubated  D: Delirium   CAM-ICU: Overall CAM-ICU: Negative  E: Early Mobility   MOVE Screen: Pass   Activity: Activity Management: activity clustered for rest period  FAS: Feeding/Nutrition   Diet order: Diet/Nutrition Received: NPO,   Fluid restriction:    T: Thrombus   DVT prophylaxis: VTE Required Core Measure: (SCDs) Sequential compression device initiated/maintained  H: HOB Elevation   Head of Bed (HOB): HOB at 30 degrees  U: Ulcer Prophylaxis   GI: no  G: Glucose control   managed Glycemic Management: blood glucose monitoring  S: Skin   Bundle compliance: yes   Bathing/Skin Care: back care, bath, chlorhexidine, bath, complete, dressed/undressed, linen changed Date: 10/22 6am by night nurse[unfilled]  B: Bowel Function   constipation   I: Indwelling Catheters   Samson necessity: [REMOVED]      Urethral Catheter 10/17/19-Reason for Continuing Urinary Catheterization: Critically ill in ICU requiring intensive monitoring       Urethral Catheter 10/19/19 2030-Reason for Continuing Urinary Catheterization: Critically ill in ICU requiring intensive monitoring   CVC necessity: No   IPAD offered: No  D: De-escalation Antibx   No; WBC count 16  Plan for the day   Continue to monitor for anymore runs of VT and manage arrhythmia.  Family/Goals of care/Code Status   Code Status: Full Code     No acute events throughout day, VS and assessment per flow sheet, patient progressing towards goals as tolerated, plan of care reviewed with Lázaro Sarah and family, all concerns addressed, will continue to monitor.    Essence Sommers RN

## 2019-10-22 NOTE — PROGRESS NOTES
Dr. Mittal currently at bedside to assess AICD settings to try to convert patient out of VT. Will continue to monitor.

## 2019-10-22 NOTE — HPI
63 year old male with PMHx significant for ischemic CMP (LVEF 15-20%) s/p AICD, insulin dependent diabetes and CAD s/p CABG 3/2008 (Radial-OM, LIMA to LAD and TOD to dRCA) who presented to [facility] as a transfer from OSH on 10/18/19 due to VT storm. Patient was shocked multiple times from his AICD for MMVT at 160 bpm. He was initially treated with amiodarone and lidocaine infusions. His VT stabilized and then was switched from lidocaine to mexiletine. He underwent a LHC that showed patent Radial-OM; LIMA-LAD; TOD to distal right diseased with collaterals to RCA and 90% stenosis to mLCX. Ventriculogram showed LVEF 10-15%. No interventions occurred. He also underwent RHC with reported normal values. Of note, this was done at OSH where pt initially presented on 10/16/19.     On 10/17/2019, he developed recurrent episodes wide complex tachycardia. His ICD was re-programmed to detect slower VT rate of 120 bpm given slow VT with a rate of 120-150 bpm with variable morphologies.     After being transferred to [facility], patient has gone into VT storm again. TTE was done that showed LVEF 23%, no apical thrombus noted, akinesis of apex and septal wall and trivial posterior pericardial effusion. It was noted he had various morphologies of MMVT suggestive of numerous areas of foci for scar-related VT. [Morphologies include: (1) RBBB positive concordance, right/superior axis TCL ~400, (2) LBBB, negative concordance, left/superior axis, TCL ~ 420 (3) RBBB positive  concordance right/inferior axis ~ 470 (4) RBBB, changes axis at V3 small rQS in V1 suggestive of epicardiac foci]. EP was consulted and performed VT ablation for 4 of the 6 VT mapped. Currently patient is on lidocaine drip at 1 mg/hr (reduced given elevated lidocaine level) and amiodarone drip at 1 mg/min. He is also slightly sedated with precedex. HTS is being consulted for advanced cardiac options evaluation. CTS is being consulted as well for sympathectomy given  incessant VT.

## 2019-10-22 NOTE — PROGRESS NOTES
Electrophysiology Progress Note    Subjective:  Underwent VT ablation yesterday. 4 VTs ablated. 6 were inducible. Resumed amiodarone and lidocaine infusions post ablation. Given most recent lidocaine level was 7.6 (normal less than 5), lido restarted at 1. Had sustained VT over night, received ATP from device as well as external cardioversion.    Current Facility-Administered Medications   Medication Dose Route Frequency Provider Last Rate Last Dose    ALPRAZolam tablet 1 mg  1 mg Oral Q12H PRN Neil Partida MD   1 mg at 10/20/19 2216    ALPRAZolam tablet 2 mg  2 mg Oral QID PRN Tiffany Chambers MD   2 mg at 10/21/19 2027    amiodarone 360 mg/200 mL (1.8 mg/mL) infusion  1 mg/min Intravenous Continuous Rudy Perrin MD 33.3 mL/hr at 10/22/19 0600 1 mg/min at 10/22/19 0600    aspirin EC tablet 81 mg  81 mg Oral Daily Ced Mittal MD   81 mg at 10/21/19 0845    carvedilol tablet 18.75 mg  18.75 mg Oral BID Ced Mittal MD        ceFAZolin injection 2 g  2 g Intravenous On Call Procedure Taylor Hernandez MD        dexmedetomidine (PRECEDEX) 400mcg/100mL 0.9% NaCL infusion  0.2 mcg/kg/hr Intravenous Continuous Ced Mittal MD 6.1 mL/hr at 10/22/19 0600 0.2 mcg/kg/hr at 10/22/19 0600    dextrose 10% (D10W) Bolus  12.5 g Intravenous PRN Rea Davila MD        dextrose 10% (D10W) Bolus  25 g Intravenous PRN Rea Davila MD        dextrose 10% (D10W) Bolus  12.5 g Intravenous PRN Ana Colvin MD        dextrose 10% (D10W) Bolus  25 g Intravenous PRN Ana Colvin MD        diphenhydrAMINE-zinc acetate 2-0.1% cream   Topical (Top) TID PRN Ced Mittal MD        dorzolamide 2 % ophthalmic solution 1 drop  1 drop Both Eyes BID Rea Davila MD   1 drop at 10/21/19 2054    gabapentin capsule 300 mg  300 mg Oral QHS Kobe Lopez MD   300 mg at 10/21/19 2027    glucagon (human recombinant) injection 1 mg  1 mg Intramuscular PRN Ced Mittal MD        glucagon  (human recombinant) injection 1 mg  1 mg Intramuscular PRN Ana Colvin MD        glucose chewable tablet 16 g  16 g Oral PRN Ana Colvin MD        glucose chewable tablet 24 g  24 g Oral PRN Ana Colvin MD        heparin infusion 1,000 units/500 ml in 0.9% NaCl (pressure line flush)    Continuous PRN Cameron Sun MD   1,000 mL at 10/21/19 1310    hydrOXYzine pamoate capsule 50 mg  50 mg Oral Q6H PRN Kobe Lopez MD   50 mg at 10/19/19 2356    insulin aspart U-100 pen 1-10 Units  1-10 Units Subcutaneous QID (AC + HS) PRN Ana Colvin MD   3 Units at 10/21/19 2318    insulin aspart U-100 pen 3 Units  3 Units Subcutaneous TIDWM Ced Mittal MD   Stopped at 10/21/19 1130    isosorbide-hydrALAZINE 20-37.5 mg per tablet 1 tablet  1 tablet Oral TID Ana Colvin MD   1 tablet at 10/21/19 2027    lidocaine 2000 mg in dextrose 5% 250 mL infusion  1 mg/min Intravenous Continuous Ced Mittal MD 7.5 mL/hr at 10/22/19 0600 1 mg/min at 10/22/19 0600    lidocaine HCL 20 mg/ml (2%) injection    PRN Cameron Sun MD   10 mL at 10/21/19 1322    lorazepam (ATIVAN) 2 mg/mL injection             phenylephrine HCl in 0.9% NaCl 1 mg/10 mL (100 mcg/mL) syringe             sodium chloride 0.9% flush 10 mL  10 mL Intravenous PRN Ced Mittal MD        sodium chloride 0.9% flush 10 mL  10 mL Intravenous PRN Danny Wells MD        timolol maleate 0.5% ophthalmic solution 1 drop  1 drop Both Eyes BID Rea Davila MD   1 drop at 10/21/19 2054        Constitutional: (-)fevers  HEENT: (-) lightheadedness  Cardiovascular: recurrence of vt, ATP and shocks received over night  Respiratory: laying in bed mostly supine without respiratory distress  Gastrointestinal: (-)abdominal pain    Vitals:    10/22/19 0312 10/22/19 0400 10/22/19 0500 10/22/19 0600   BP:       BP Location:       Patient Position:       Pulse: 60 60 60 60   Resp: 20 20 (!) 21 (!) 23   Temp:       TempSrc:        SpO2: 97% 98% 95% 97%   Weight:       Height:         Physical Exam:   Gen: no acute distress  HEENT: extra-ocular muscles intact, normocephalic-atraumatic  CVS: regular rate and rhythm  CHEST: non labored respirations  ABD: Soft, non-tender  EXT: No Edema. Bilateral groin access sites without hematoma, oozing, or bleeding  NEURO: awake, alert    Review of Labs:   CMP  Sodium   Date Value Ref Range Status   10/22/2019 128 (L) 136 - 145 mmol/L Final     Potassium   Date Value Ref Range Status   10/22/2019 3.5 3.5 - 5.1 mmol/L Final     Chloride   Date Value Ref Range Status   10/22/2019 87 (L) 95 - 110 mmol/L Final     CO2   Date Value Ref Range Status   10/22/2019 29 23 - 29 mmol/L Final     Glucose   Date Value Ref Range Status   10/22/2019 297 (H) 70 - 110 mg/dL Final     BUN, Bld   Date Value Ref Range Status   10/22/2019 34 (H) 8 - 23 mg/dL Final     Creatinine   Date Value Ref Range Status   10/22/2019 1.4 0.5 - 1.4 mg/dL Final     Calcium   Date Value Ref Range Status   10/22/2019 9.5 8.7 - 10.5 mg/dL Final     Total Protein   Date Value Ref Range Status   10/22/2019 6.6 6.0 - 8.4 g/dL Final     Albumin   Date Value Ref Range Status   10/22/2019 2.7 (L) 3.5 - 5.2 g/dL Final     Total Bilirubin   Date Value Ref Range Status   10/22/2019 0.3 0.1 - 1.0 mg/dL Final     Comment:     For infants and newborns, interpretation of results should be based  on gestational age, weight and in agreement with clinical  observations.  Premature Infant recommended reference ranges:  Up to 24 hours.............<8.0 mg/dL  Up to 48 hours............<12.0 mg/dL  3-5 days..................<15.0 mg/dL  6-29 days.................<15.0 mg/dL       Alkaline Phosphatase   Date Value Ref Range Status   10/22/2019 127 55 - 135 U/L Final     AST   Date Value Ref Range Status   10/22/2019 31 10 - 40 U/L Final     ALT   Date Value Ref Range Status   10/22/2019 8 (L) 10 - 44 U/L Final     Anion Gap   Date Value Ref Range Status   10/22/2019  12 8 - 16 mmol/L Final     eGFR if    Date Value Ref Range Status   10/22/2019 >60.0 >60 mL/min/1.73 m^2 Final     eGFR if non    Date Value Ref Range Status   10/22/2019 52.7 (A) >60 mL/min/1.73 m^2 Final     Comment:     Calculation used to obtain the estimated glomerular filtration  rate (eGFR) is the CKD-EPI equation.        Lab Results   Component Value Date    WBC 16.11 (H) 10/22/2019    HGB 12.2 (L) 10/22/2019    HCT 37.3 (L) 10/22/2019    MCV 88 10/22/2019     (H) 10/22/2019     Lab Results   Component Value Date    INR 1.3 (H) 10/18/2019     Assessment/Plan:  Patient Active Problem List    Diagnosis Date Noted    Ventricular tachycardia 10/21/2019    Acute hypoxemic respiratory failure 10/18/2019    Acute on chronic systolic heart failure 10/16/2019    Angioedema 10/16/2019    Diabetes mellitus with neurological manifestations 10/16/2019    CAD (coronary artery disease) 10/16/2019    Leukocytosis 10/16/2019    Morbid obesity 10/16/2019    Sustained ventricular tachycardia 10/16/2019    Implantable cardioverter-defibrillator (ICD) generator end of life 05/18/2018    Chronic rhinitis 10/04/2016    History of hypertension 10/04/2016    Depression 05/25/2016    Essential hypertension 05/25/2016    MEGAN (obstructive sleep apnea) 05/25/2016    Somnolence 05/25/2016     Lázaro Sarah is a 63 y.o. male with ICMO, CAD s/p CABG, DM, HTN, MEGAN, and sustained VT [place] [facility] [place] management of his VT.     1. Ventricular Tachycardia  - has [employer] dual chamber ICD in place. See interrogation report for full programming details.   - his VTs from over night were slower (110s), more narrow, and relatively well tolerated from hemodynamic standpoint. His device did not deliver therapies initially secondary to the SVT discriminator algorithm it seems.  - on amiodarone infusion at 1. Continue at 1 for another 24 hours.   - on  lidocaine infusion at 1. Continue for now. Check lidocaine level today. Last lidocaine level was 7.6 in context of having lidocaine infusion running at 3/hr.  - on carvedilol 18.75 mg po bid. Recommend changing to metoprolol succinate 75 mg po bid as metoprolol may allow for more effective VT suppression. Another consideration would be to discontinue carvedilol and begin either propranolol or nadolol as each have been shown to be more effective in VT suppression. This would have the trade off though of using a beta blocker that is not one of the three beta blockers indicated in heart failure.   - underwent VT ablation with Dr Sun on 10/21/19. 4 VTs ablated. 6 distinct morphologies were noted including epicardial exits. Large anteroseptal and apical scar noted on mapping. ICE also confirmed large aneurysmal apex.  - Maintain potassium greater than 4 and magnesium greater than 2.   - If he has recurrence, he may need consideration for advanced HF therapies/options.   - If he has recurrent VT, he may also need consideration for cardiac sympathectomy.     Rudy Perrin MD PGY7

## 2019-10-22 NOTE — NURSING
Pt's SBP 80s at this time, map 50-60s. Precedex paused. Team called, will administer 500 cc bolus NS. WCTM.

## 2019-10-23 ENCOUNTER — DOCUMENTATION ONLY (OUTPATIENT)
Dept: ELECTROPHYSIOLOGY | Facility: CLINIC | Age: 64
End: 2019-10-23

## 2019-10-23 ENCOUNTER — DOCUMENTATION ONLY (OUTPATIENT)
Dept: CARDIOLOGY | Facility: HOSPITAL | Age: 64
End: 2019-10-23

## 2019-10-23 LAB
ALBUMIN SERPL BCP-MCNC: 2.3 G/DL (ref 3.5–5.2)
ALP SERPL-CCNC: 110 U/L (ref 55–135)
ALT SERPL W/O P-5'-P-CCNC: 5 U/L (ref 10–44)
ANION GAP SERPL CALC-SCNC: 10 MMOL/L (ref 8–16)
ANION GAP SERPL CALC-SCNC: 7 MMOL/L (ref 8–16)
ANION GAP SERPL CALC-SCNC: 8 MMOL/L (ref 8–16)
AST SERPL-CCNC: 14 U/L (ref 10–40)
BASOPHILS # BLD AUTO: 0.04 K/UL (ref 0–0.2)
BASOPHILS NFR BLD: 0.3 % (ref 0–1.9)
BILIRUB SERPL-MCNC: 0.3 MG/DL (ref 0.1–1)
BUN SERPL-MCNC: 21 MG/DL (ref 8–23)
BUN SERPL-MCNC: 22 MG/DL (ref 8–23)
BUN SERPL-MCNC: 25 MG/DL (ref 8–23)
CALCIUM SERPL-MCNC: 9.1 MG/DL (ref 8.7–10.5)
CALCIUM SERPL-MCNC: 9.2 MG/DL (ref 8.7–10.5)
CALCIUM SERPL-MCNC: 9.2 MG/DL (ref 8.7–10.5)
CHLORIDE SERPL-SCNC: 92 MMOL/L (ref 95–110)
CHLORIDE SERPL-SCNC: 93 MMOL/L (ref 95–110)
CHLORIDE SERPL-SCNC: 93 MMOL/L (ref 95–110)
CO2 SERPL-SCNC: 25 MMOL/L (ref 23–29)
CO2 SERPL-SCNC: 26 MMOL/L (ref 23–29)
CO2 SERPL-SCNC: 27 MMOL/L (ref 23–29)
CREAT SERPL-MCNC: 1 MG/DL (ref 0.5–1.4)
DIFFERENTIAL METHOD: ABNORMAL
EOSINOPHIL # BLD AUTO: 0.3 K/UL (ref 0–0.5)
EOSINOPHIL NFR BLD: 2.3 % (ref 0–8)
ERYTHROCYTE [DISTWIDTH] IN BLOOD BY AUTOMATED COUNT: 12.3 % (ref 11.5–14.5)
EST. GFR  (AFRICAN AMERICAN): >60 ML/MIN/1.73 M^2
EST. GFR  (NON AFRICAN AMERICAN): >60 ML/MIN/1.73 M^2
GLUCOSE SERPL-MCNC: 161 MG/DL (ref 70–110)
GLUCOSE SERPL-MCNC: 218 MG/DL (ref 70–110)
GLUCOSE SERPL-MCNC: 255 MG/DL (ref 70–110)
HCT VFR BLD AUTO: 32.4 % (ref 40–54)
HGB BLD-MCNC: 10.8 G/DL (ref 14–18)
IMM GRANULOCYTES # BLD AUTO: 0.15 K/UL (ref 0–0.04)
IMM GRANULOCYTES NFR BLD AUTO: 1.1 % (ref 0–0.5)
LIDOCAIN SERPL-MCNC: 3.9 MCG/ML (ref 1.5–5)
LYMPHOCYTES # BLD AUTO: 2.2 K/UL (ref 1–4.8)
LYMPHOCYTES NFR BLD: 15.4 % (ref 18–48)
MAGNESIUM SERPL-MCNC: 1.9 MG/DL (ref 1.6–2.6)
MAGNESIUM SERPL-MCNC: 2.1 MG/DL (ref 1.6–2.6)
MAGNESIUM SERPL-MCNC: 2.2 MG/DL (ref 1.6–2.6)
MCH RBC QN AUTO: 28.7 PG (ref 27–31)
MCHC RBC AUTO-ENTMCNC: 33.3 G/DL (ref 32–36)
MCV RBC AUTO: 86 FL (ref 82–98)
MONOCYTES # BLD AUTO: 1.3 K/UL (ref 0.3–1)
MONOCYTES NFR BLD: 9 % (ref 4–15)
NEUTROPHILS # BLD AUTO: 10.2 K/UL (ref 1.8–7.7)
NEUTROPHILS NFR BLD: 71.9 % (ref 38–73)
NRBC BLD-RTO: 0 /100 WBC
PHOSPHATE SERPL-MCNC: 2.4 MG/DL (ref 2.7–4.5)
PLATELET # BLD AUTO: 378 K/UL (ref 150–350)
PMV BLD AUTO: 10.9 FL (ref 9.2–12.9)
POCT GLUCOSE: 175 MG/DL (ref 70–110)
POCT GLUCOSE: 180 MG/DL (ref 70–110)
POCT GLUCOSE: 212 MG/DL (ref 70–110)
POCT GLUCOSE: 235 MG/DL (ref 70–110)
POTASSIUM SERPL-SCNC: 3.6 MMOL/L (ref 3.5–5.1)
POTASSIUM SERPL-SCNC: 3.8 MMOL/L (ref 3.5–5.1)
POTASSIUM SERPL-SCNC: 3.8 MMOL/L (ref 3.5–5.1)
PROT SERPL-MCNC: 5.9 G/DL (ref 6–8.4)
RBC # BLD AUTO: 3.76 M/UL (ref 4.6–6.2)
SODIUM SERPL-SCNC: 126 MMOL/L (ref 136–145)
SODIUM SERPL-SCNC: 127 MMOL/L (ref 136–145)
SODIUM SERPL-SCNC: 128 MMOL/L (ref 136–145)
WBC # BLD AUTO: 14.19 K/UL (ref 3.9–12.7)

## 2019-10-23 PROCEDURE — 99233 SBSQ HOSP IP/OBS HIGH 50: CPT | Mod: ,,, | Performed by: INTERNAL MEDICINE

## 2019-10-23 PROCEDURE — 76937 US GUIDE VASCULAR ACCESS: CPT

## 2019-10-23 PROCEDURE — 25000003 PHARM REV CODE 250: Performed by: INTERNAL MEDICINE

## 2019-10-23 PROCEDURE — 80048 BASIC METABOLIC PNL TOTAL CA: CPT | Mod: 91

## 2019-10-23 PROCEDURE — 83735 ASSAY OF MAGNESIUM: CPT | Mod: 91

## 2019-10-23 PROCEDURE — 25000003 PHARM REV CODE 250: Performed by: STUDENT IN AN ORGANIZED HEALTH CARE EDUCATION/TRAINING PROGRAM

## 2019-10-23 PROCEDURE — 63600175 PHARM REV CODE 636 W HCPCS: Performed by: INTERNAL MEDICINE

## 2019-10-23 PROCEDURE — 93922 UPR/L XTREMITY ART 2 LEVELS: CPT | Performed by: SURGERY

## 2019-10-23 PROCEDURE — 63600175 PHARM REV CODE 636 W HCPCS: Performed by: STUDENT IN AN ORGANIZED HEALTH CARE EDUCATION/TRAINING PROGRAM

## 2019-10-23 PROCEDURE — 99233 PR SUBSEQUENT HOSPITAL CARE,LEVL III: ICD-10-PCS | Mod: ,,, | Performed by: INTERNAL MEDICINE

## 2019-10-23 PROCEDURE — 36410 VNPNXR 3YR/> PHY/QHP DX/THER: CPT

## 2019-10-23 PROCEDURE — C1751 CATH, INF, PER/CENT/MIDLINE: HCPCS

## 2019-10-23 PROCEDURE — 27000221 HC OXYGEN, UP TO 24 HOURS

## 2019-10-23 PROCEDURE — 80053 COMPREHEN METABOLIC PANEL: CPT

## 2019-10-23 PROCEDURE — 83735 ASSAY OF MAGNESIUM: CPT

## 2019-10-23 PROCEDURE — 94761 N-INVAS EAR/PLS OXIMETRY MLT: CPT

## 2019-10-23 PROCEDURE — 99900035 HC TECH TIME PER 15 MIN (STAT)

## 2019-10-23 PROCEDURE — 20000000 HC ICU ROOM

## 2019-10-23 PROCEDURE — 84100 ASSAY OF PHOSPHORUS: CPT

## 2019-10-23 PROCEDURE — 85025 COMPLETE CBC W/AUTO DIFF WBC: CPT

## 2019-10-23 PROCEDURE — 99232 PR SUBSEQUENT HOSPITAL CARE,LEVL II: ICD-10-PCS | Mod: GC,,, | Performed by: INTERNAL MEDICINE

## 2019-10-23 PROCEDURE — 99232 SBSQ HOSP IP/OBS MODERATE 35: CPT | Mod: GC,,, | Performed by: INTERNAL MEDICINE

## 2019-10-23 PROCEDURE — 80048 BASIC METABOLIC PNL TOTAL CA: CPT

## 2019-10-23 RX ORDER — POTASSIUM CHLORIDE 20 MEQ/1
40 TABLET, EXTENDED RELEASE ORAL ONCE
Status: COMPLETED | OUTPATIENT
Start: 2019-10-23 | End: 2019-10-23

## 2019-10-23 RX ORDER — ALPRAZOLAM 0.5 MG/1
1 TABLET ORAL 3 TIMES DAILY PRN
Status: DISCONTINUED | OUTPATIENT
Start: 2019-10-23 | End: 2019-10-24 | Stop reason: HOSPADM

## 2019-10-23 RX ORDER — POTASSIUM CHLORIDE 750 MG/1
10 CAPSULE, EXTENDED RELEASE ORAL ONCE
Status: COMPLETED | OUTPATIENT
Start: 2019-10-23 | End: 2019-10-23

## 2019-10-23 RX ORDER — MAGNESIUM SULFATE HEPTAHYDRATE 40 MG/ML
2 INJECTION, SOLUTION INTRAVENOUS ONCE
Status: COMPLETED | OUTPATIENT
Start: 2019-10-23 | End: 2019-10-23

## 2019-10-23 RX ORDER — POTASSIUM CHLORIDE 20 MEQ/1
20 TABLET, EXTENDED RELEASE ORAL DAILY
Status: DISCONTINUED | OUTPATIENT
Start: 2019-10-23 | End: 2019-10-24

## 2019-10-23 RX ORDER — VALSARTAN 40 MG/1
40 TABLET ORAL 2 TIMES DAILY
Status: DISCONTINUED | OUTPATIENT
Start: 2019-10-23 | End: 2019-10-23

## 2019-10-23 RX ADMIN — DORZOLAMIDE HYDROCHLORIDE 1 DROP: 20 SOLUTION/ DROPS OPHTHALMIC at 08:10

## 2019-10-23 RX ADMIN — GABAPENTIN 300 MG: 300 CAPSULE ORAL at 08:10

## 2019-10-23 RX ADMIN — AMIODARONE HYDROCHLORIDE 1 MG/MIN: 1.8 INJECTION, SOLUTION INTRAVENOUS at 03:10

## 2019-10-23 RX ADMIN — DEXMEDETOMIDINE HYDROCHLORIDE 0.25 MCG/KG/HR: 4 INJECTION, SOLUTION INTRAVENOUS at 03:10

## 2019-10-23 RX ADMIN — DEXMEDETOMIDINE HYDROCHLORIDE 0.2 MCG/KG/HR: 4 INJECTION, SOLUTION INTRAVENOUS at 02:10

## 2019-10-23 RX ADMIN — INSULIN ASPART 2 UNITS: 100 INJECTION, SOLUTION INTRAVENOUS; SUBCUTANEOUS at 10:10

## 2019-10-23 RX ADMIN — POTASSIUM CHLORIDE 40 MEQ: 1500 TABLET, EXTENDED RELEASE ORAL at 03:10

## 2019-10-23 RX ADMIN — INSULIN ASPART 3 UNITS: 100 INJECTION, SOLUTION INTRAVENOUS; SUBCUTANEOUS at 12:10

## 2019-10-23 RX ADMIN — POTASSIUM CHLORIDE 40 MEQ: 1500 TABLET, EXTENDED RELEASE ORAL at 08:10

## 2019-10-23 RX ADMIN — INSULIN ASPART 2 UNITS: 100 INJECTION, SOLUTION INTRAVENOUS; SUBCUTANEOUS at 08:10

## 2019-10-23 RX ADMIN — HYDRALAZINE HYDROCHLORIDE AND ISOSORBIDE DINITRATE 1 TABLET: 37.5; 2 TABLET, FILM COATED ORAL at 02:10

## 2019-10-23 RX ADMIN — POTASSIUM CHLORIDE 20 MEQ: 1500 TABLET, EXTENDED RELEASE ORAL at 08:10

## 2019-10-23 RX ADMIN — INSULIN ASPART 3 UNITS: 100 INJECTION, SOLUTION INTRAVENOUS; SUBCUTANEOUS at 08:10

## 2019-10-23 RX ADMIN — ASPIRIN 81 MG: 81 TABLET, COATED ORAL at 08:10

## 2019-10-23 RX ADMIN — POTASSIUM CHLORIDE 10 MEQ: 750 CAPSULE, EXTENDED RELEASE ORAL at 12:10

## 2019-10-23 RX ADMIN — HYDRALAZINE HYDROCHLORIDE AND ISOSORBIDE DINITRATE 1 TABLET: 37.5; 2 TABLET, FILM COATED ORAL at 08:10

## 2019-10-23 RX ADMIN — INSULIN ASPART 4 UNITS: 100 INJECTION, SOLUTION INTRAVENOUS; SUBCUTANEOUS at 12:10

## 2019-10-23 RX ADMIN — ALPRAZOLAM 1 MG: 0.5 TABLET ORAL at 02:10

## 2019-10-23 RX ADMIN — DEXMEDETOMIDINE HYDROCHLORIDE 0.4 MCG/KG/HR: 4 INJECTION, SOLUTION INTRAVENOUS at 11:10

## 2019-10-23 RX ADMIN — TIMOLOL MALEATE 1 DROP: 5 SOLUTION OPHTHALMIC at 08:10

## 2019-10-23 RX ADMIN — AMIODARONE HYDROCHLORIDE 1 MG/MIN: 1.8 INJECTION, SOLUTION INTRAVENOUS at 02:10

## 2019-10-23 RX ADMIN — MICONAZOLE NITRATE: 20 OINTMENT TOPICAL at 08:10

## 2019-10-23 RX ADMIN — AMIODARONE HYDROCHLORIDE 1 MG/MIN: 1.8 INJECTION, SOLUTION INTRAVENOUS at 10:10

## 2019-10-23 RX ADMIN — INSULIN ASPART 2 UNITS: 100 INJECTION, SOLUTION INTRAVENOUS; SUBCUTANEOUS at 05:10

## 2019-10-23 RX ADMIN — METOPROLOL SUCCINATE 75 MG: 25 TABLET, EXTENDED RELEASE ORAL at 08:10

## 2019-10-23 RX ADMIN — LIDOCAINE HYDROCHLORIDE 1 MG/MIN: 8 INJECTION, SOLUTION INTRAVENOUS at 03:10

## 2019-10-23 RX ADMIN — AMIODARONE HYDROCHLORIDE 0.5 MG/MIN: 1.8 INJECTION, SOLUTION INTRAVENOUS at 11:10

## 2019-10-23 RX ADMIN — INSULIN ASPART 3 UNITS: 100 INJECTION, SOLUTION INTRAVENOUS; SUBCUTANEOUS at 05:10

## 2019-10-23 RX ADMIN — MAGNESIUM SULFATE IN WATER 2 G: 40 INJECTION, SOLUTION INTRAVENOUS at 03:10

## 2019-10-23 NOTE — CARE UPDATE
- 01:18 A.M.  - One episode of Monomorphic narrow complex V Tach; Failed ATP x3, slow VT @ 110bpm. Single AICD shock converted monomorphic VT into NSR.   - Currently on Amio 1, Lido 1, precedex gtt  - During VT episode, patient's BP dropped to 71/48- MAP 55. Post shock, /62-MAP 80  - Draw morning labs early to evaluate for electrolyte abnormalities. Will replace as needed.   - Discussed with EP team and primary team

## 2019-10-23 NOTE — CONSULTS
[facility]  Thoracic Surgery  Consult Note    Patient Name: Lázaro Sarah  MRN: [id number]  Code Status: Full Code  Admission Date: 10/18/2019  Hospital Length of Stay: 5 days  Consult Requesting Physician: Dr. Davila  Consulting Physician: Dr. Liz  Primary Care Provider: Cameron Arteaga DO    Inpatient consult to Cardiothoracic Surgery  Consult performed by: Brooke Askew PA-C  Consult ordered by: Neil Partida MD        Subjective:     Reason for Consult: sympathectomy     History of Present Illness: 65 yo male with PMHx significant for ischemic cardiomyopathy s/p AICD, depressions, IDDM, CAD s/p CABG (Radial-OM, LIMA to LAD and TOD to dRCA) in 2008 [place] [facility] [place] of VT storm. Shocked multiple times for MMVT prompting admission and ultimately transfer. Despite amiodarone and lidocaine infusions continues to have VT. Ultimately underwent ablation and 4 of 6 pathways were ablated on 10/21/19. He continues to have AICD fire for presistent vtach.TTE was done that showed LVEF 23%, no apical thrombus noted, akinesis of apex and septal wall. Heart transplant consulted for consideration of advanced cardiac options. Thoracic consulted for consideration of sympathectomy. On precedex at time of evaluation and no family present.     No current facility-administered medications on file prior to encounter.      No current outpatient medications on file prior to encounter.       Review of patient's allergies indicates:   Allergen Reactions    Ace inhibitors Swelling    Adhesive Hives    Entresto [sacubitril-valsartan] Swelling    Novolin 70/30 (semi-synthetic) Hives    Codeine Nausea And Vomiting    Statins-hmg-coa reductase inhibitors Swelling    Bupropion hcl Rash       Past Medical History:   Diagnosis Date    Acute on chronic systolic heart failure 10/16/2019    CAD (coronary artery disease)     Cancer     Skin Cancer     CHF (congestive heart failure)      "Chronic rhinitis 10/4/2016    Depression 2016    Diabetes mellitus     Encounter for blood transfusion     during pt open heart surgery 1998    History of hypertension 10/4/2016    Hypertension     Implantable cardioverter-defibrillator (ICD) generator end of life 2018    Leukocytosis 10/16/2019     Past Surgical History:   Procedure Laterality Date    ABDOMINAL SURGERY      hernia repair    CARDIAC SURGERY      open heart    EYE SURGERY      HERNIA REPAIR      SKIN BIOPSY       Family History     None        Tobacco Use    Smoking status: Former Smoker     Types: Cigarettes     Last attempt to quit: 3/18/2006     Years since quittin.6   Substance and Sexual Activity    Alcohol use: Yes     Comment: per pt "once in a while"    Drug use: Yes     Comment: per pt "Carri on Percocet 2 yrs ago in July"    Sexual activity: Not Currently     Review of Systems   Reason unable to perform ROS: on small dose of precedex at time of evaluation.   Constitutional: Positive for fatigue.   Respiratory: Negative for cough and shortness of breath.    Cardiovascular: Positive for chest pain.   Gastrointestinal: Negative for abdominal pain.   Skin: Negative for color change.   Neurological: Negative for dizziness.   Psychiatric/Behavioral: Negative for agitation. The patient is nervous/anxious.      Objective:     Vital Signs (Most Recent):  Temp: 98.5 °F (36.9 °C) (10/23/19 1100)  Pulse: 60 (10/23/19 1300)  Resp: 18 (10/23/19 1300)  BP: (!) 114/53 (10/23/19 0856)  SpO2: 95 % (10/23/19 1300) Vital Signs (24h Range):  Temp:  [97 °F (36.1 °C)-98.8 °F (37.1 °C)] 98.5 °F (36.9 °C)  Pulse:  [60-98] 60  Resp:  [13-26] 18  SpO2:  [92 %-97 %] 95 %  BP: ()/(52-53) 114/53  Arterial Line BP: ()/(51-78) 120/52     Weight: 121.1 kg (267 lb)  Body mass index is 39.43 kg/m².      Intake/Output - Last 3 Shifts       10/21 0700 - 10/22 0659 10/22 07 - 10/23 0659 10/23 0700 - 10/24 0659    P.O. 360 360 340    " I.V. (mL/kg) 1330 (11) 2409.8 (19.9) 398.6 (3.3)    Total Intake(mL/kg) 1690 (14) 2769.8 (22.9) 738.6 (6.1)    Urine (mL/kg/hr) 1994 (0.7) 1185 (0.4) 445 (0.4)    Stool 0 0     Total Output 1994 1185 445    Net -304 +1584.8 +293.6           Stool Occurrence 0 x 0 x           SpO2: 95 %  O2 Device (Oxygen Therapy): nasal cannula    Physical Exam   Constitutional: He appears well-developed and well-nourished.   Laying in bed  On precedex   Eyes: EOM are normal.   Cardiovascular: Normal rate.   Paced   Pulmonary/Chest: Effort normal.   Abdominal: Soft.   Musculoskeletal: Normal range of motion.   Neurological: He is alert.   Skin: Skin is warm and dry.   Psychiatric:   drowsy   Vitals reviewed.      Significant Labs:  BMP:   Recent Labs   Lab 10/23/19  0957   *   *   K 3.8   CL 93*   CO2 26   BUN 22   CREATININE 1.0   CALCIUM 9.2   MG 2.2     CBC:   Recent Labs   Lab 10/23/19  0126   WBC 14.19*   RBC 3.76*   HGB 10.8*   HCT 32.4*   *   MCV 86   MCH 28.7   MCHC 33.3     echo  · Mild left ventricular enlargement.  · Severely decreased left ventricular systolic function. The estimated ejection fraction is 23%  · Local segmental wall motion abnormalities.  · Eccentric left ventricular hypertrophy.  · Grade I (mild) left ventricular diastolic dysfunction consistent with impaired relaxation.  · Moderate left atrial enlargement.  · Low normal right ventricular systolic function.  · Mild right atrial enlargement.  · Normal central venous pressure (3 mm Hg).  · Trivial posterolateral pericardial effusion.    Significant Diagnostics:  CXR: I have reviewed all pertinent results/findings within the past 24 hours    VTE Risk Mitigation (From admission, onward)         Ordered     IP VTE LOW RISK PATIENT  Once      10/18/19 2135     Place sequential compression device  Until discontinued      10/18/19 2135              Assessment/Plan:     Ventricular tachycardia    Thoracic surgery consulted for consideration of  sympathectomy. Poor operative candidate with underlying heart failure and previous CABG including LIMA which likely means increased scar tissue formation on that side making the operation more difficulty. Discussion with patient about the indication and rationale for sympathectomy however limited data to support. Single lung anesthesia is needed for operation and positioning patient lateral. Operation carries an increased risk of arrhythmia refractory to medication and cardioversion intraoperatively which could result in intraoperative death. Heart transplant has already been consulted. May consider consult to regional pain service for stellate ganglion injection. Increased risk for a Ofe's syndrome. Discussed with EP and primary.     Thank you for your consult. I will sign off. Please contact us if you have any additional questions.    Brooke Askew PA-C  Thoracic Surgery  [facility]

## 2019-10-23 NOTE — ASSESSMENT & PLAN NOTE
Poor operative candidate with underlying heart failure and previous CABG including LIMA which likely means increased scar tissue formation on that side making the operation more difficulty. Discussion with patient about the indication and rationale for sympathectomy however limited data to support. Operation carries an increased risk of arrhythmia refractory to medication and cardioversion intraoperatively which could result in intraoperative death. Heart transplant has already been consulted. May consider consult to regional pain service for stellate ganglion injection. Increased risk for a Ofe's syndrome. Discussed with EP and primary.     Thank you for this consult. Thoracic surgery with sign off.

## 2019-10-23 NOTE — SUBJECTIVE & OBJECTIVE
Interval History: Overnight patient suffered from one shock at 1:18 am for monomorphic VT and failed ATP, he continues to be on amio 1, lido 1 and toprol xl 75.   Continues to have very poor pedal pulses     Continuous Infusions:   amiodarone in dextrose 5% 0.5 mg/min (10/23/19 1534)    dexmedetomidine (PRECEDEX) infusion 0.2 mcg/kg/hr (10/23/19 1449)    lidocaine 0.5 mg/min (10/23/19 1534)     Scheduled Meds:   aspirin  81 mg Oral Daily    dorzolamide  1 drop Both Eyes BID    gabapentin  300 mg Oral QHS    insulin aspart U-100  3 Units Subcutaneous TIDWM    isosorbide-hydrALAZINE 20-37.5 mg  1 tablet Oral TID    metoprolol succinate  75 mg Oral Daily    miconazole nitrate 2%   Topical (Top) BID    potassium chloride  20 mEq Oral Daily    timolol maleate 0.5%  1 drop Both Eyes BID     PRN Meds:ALPRAZolam, Dextrose 10% Bolus, Dextrose 10% Bolus, Dextrose 10% Bolus, Dextrose 10% Bolus, diphenhydrAMINE-zinc acetate 2-0.1%, glucagon (human recombinant), glucagon (human recombinant), glucose, glucose, hydrOXYzine pamoate, insulin aspart U-100, lidocaine HCL 20 mg/ml (2%), sodium chloride 0.9%, sodium chloride 0.9%    Review of patient's allergies indicates:   Allergen Reactions    Ace inhibitors Swelling    Adhesive Hives    Entresto [sacubitril-valsartan] Swelling    Novolin 70/30 (semi-synthetic) Hives    Codeine Nausea And Vomiting    Statins-hmg-coa reductase inhibitors Swelling    Bupropion hcl Rash     Objective:     Vital Signs (Most Recent):  Temp: 98.5 °F (36.9 °C) (10/23/19 1100)  Pulse: 60 (10/23/19 1500)  Resp: (!) 25 (10/23/19 1500)  BP: (!) 114/53 (10/23/19 0856)  SpO2: (!) 94 % (10/23/19 1500) Vital Signs (24h Range):  Temp:  [97 °F (36.1 °C)-98.8 °F (37.1 °C)] 98.5 °F (36.9 °C)  Pulse:  [60-98] 60  Resp:  [13-26] 25  SpO2:  [92 %-97 %] 94 %  BP: (114)/(53) 114/53  Arterial Line BP: ()/(51-78) 124/55     Patient Vitals for the past 72 hrs (Last 3 readings):   Weight   10/21/19 0701  121.1 kg (267 lb)     Body mass index is 39.43 kg/m².      Intake/Output Summary (Last 24 hours) at 10/23/2019 1756  Last data filed at 10/23/2019 1300  Gross per 24 hour   Intake 2353.33 ml   Output 1095 ml   Net 1258.33 ml   Physical Exam   Constitutional: He is oriented to person, place, and time. He appears well-developed.   Eyes: EOM are normal.   Neck: Neck supple.   Cardiovascular: Normal rate and regular rhythm.   Pulmonary/Chest: Breath sounds normal. No respiratory distress.   Abdominal: Soft. Bowel sounds are normal.   Neurological: He is alert and oriented to person, place, and time.   Skin: Skin is warm and dry.          Significant Labs:  CBC:  Recent Labs   Lab 10/21/19  0319 10/22/19  0300 10/23/19  0126   WBC 14.80* 16.11* 14.19*   RBC 4.53* 4.24* 3.76*   HGB 12.8* 12.2* 10.8*   HCT 39.4* 37.3* 32.4*   * 438* 378*   MCV 87 88 86   MCH 28.3 28.8 28.7   MCHC 32.5 32.7 33.3     BNP:  Recent Labs   Lab 10/18/19  2151   *     CMP:  Recent Labs   Lab 10/21/19  0319  10/22/19  0300  10/23/19  0126 10/23/19  0957 10/23/19  1653   *   < > 297*   < > 161* 255* 218*   CALCIUM 9.8   < > 9.5   < > 9.1 9.2 9.2   ALBUMIN 2.9*  --  2.7*  --  2.3*  --   --    PROT 7.0  --  6.6  --  5.9*  --   --    *   < > 128*   < > 128* 127* 126*   K 3.6   < > 3.5   < > 3.6 3.8 3.8   CO2 27   < > 29   < > 25 26 27   CL 89*   < > 87*   < > 93* 93* 92*   BUN 41*   < > 34*   < > 25* 22 21   CREATININE 1.3   < > 1.4   < > 1.0 1.0 1.0   ALKPHOS 133  --  127  --  110  --   --    ALT 9*  --  8*  --  5*  --   --    AST 10  --  31  --  14  --   --    BILITOT 0.4  --  0.3  --  0.3  --   --     < > = values in this interval not displayed.      Coagulation:   Recent Labs   Lab 10/18/19  2321   INR 1.3*   APTT 28.0     LDH:  No results for input(s): LDH in the last 72 hours.  Microbiology:  Microbiology Results (last 7 days)     ** No results found for the last 168 hours. **

## 2019-10-23 NOTE — HPI
63 yo male with PMHx significant for ischemic cardiomyopathy s/p AICD, depressions, IDDM, CAD s/p CABG (Radial-OM, LIMA to LAD and TOD to dRCA) in 2008 transferred to [facility] [place] for management of VT storm. Shocked multiple times for MMVT prompting admission and ultimately transfer. Despite amiodarone and lidocaine infusions continues to have VT. Ultimately underwent ablation and 4 of 6 pathways were ablated on 10/21/19. He continues to have AICD fire for presistent vtach.TTE was done that showed LVEF 23%, no apical thrombus noted, akinesis of apex and septal wall. Heart transplant consulted for consideration of advanced cardiac options. Thoracic consulted for consideration of sympathectomy. On precedex at time of evaluation and no family present.

## 2019-10-23 NOTE — PROGRESS NOTES
Inpatient device interrogation and/or reprogramming orders received; the industry representative was contacted and provided with patient's name and room number.

## 2019-10-23 NOTE — PROGRESS NOTES
[facility]  Cardiology  Progress Note    Patient Name: Lázaro Sarah  MRN: [id number]  Admission Date: 10/18/2019  Hospital Length of Stay: 5 days  Code Status: Full Code   Attending Physician: Rea Davila MD   Primary Care Physician: Cameron Arteaga DO  Expected Discharge Date: 10/24/2019  Principal Problem:Sustained ventricular tachycardia    Subjective:     Hospital Course:   Patient admitted to CCU on 10/18 with concerns of multiple AICD shocks for his VT storm. Patient had continued amiodarone and lidocaine infusion with plans to undergo ablation on 10/21/19. Hospital course so far complicated by multiple shocks leading to severe patient discomfort. Symptoms managed with prn Xanax. Patient underwent ablation procedure on 10/21. He had 6 foci, 4 of which were ablated. Despite this, patient continued to have his AICD fire for vtach. Discussing with CTS and heart failure regarding advanced options including sympathectomy or heart transplant.      Interval History: patient had additional shock during the night and once this morning. Cardiac device was interrogated. Advanced heart failure and CTS following for advance options.     Review of Systems   Constitution: Positive for decreased appetite and malaise/fatigue. Negative for chills and night sweats.   HENT: Negative for congestion.    Cardiovascular: Positive for chest pain (discomfort). Negative for irregular heartbeat and leg swelling.   Respiratory: Negative for cough and shortness of breath.    Endocrine: Negative for cold intolerance and heat intolerance.   Skin: Negative for rash.   Musculoskeletal: Negative for arthritis and back pain.   Gastrointestinal: Negative for abdominal pain, constipation and diarrhea.   Genitourinary: Negative for dysuria and hematuria.   Neurological: Negative for dizziness and headaches.   Psychiatric/Behavioral: Negative for altered mental status. The patient is nervous/anxious.      Objective:      Vital Signs (Most Recent):  Temp: 98.5 °F (36.9 °C) (10/23/19 1100)  Pulse: 60 (10/23/19 1300)  Resp: 18 (10/23/19 1300)  BP: (!) 114/53 (10/23/19 0856)  SpO2: 95 % (10/23/19 1300) Vital Signs (24h Range):  Temp:  [97 °F (36.1 °C)-98.8 °F (37.1 °C)] 98.5 °F (36.9 °C)  Pulse:  [60-98] 60  Resp:  [13-26] 18  SpO2:  [91 %-97 %] 95 %  BP: ()/(49-56) 114/53  Arterial Line BP: ()/(51-78) 120/52     Weight: 121.1 kg (267 lb)  Body mass index is 39.43 kg/m².     SpO2: 95 %  O2 Device (Oxygen Therapy): nasal cannula      Intake/Output Summary (Last 24 hours) at 10/23/2019 1349  Last data filed at 10/23/2019 1300  Gross per 24 hour   Intake 2751.52 ml   Output 1290 ml   Net 1461.52 ml       Lines/Drains/Airways     Drain                 Urethral Catheter 10/19/19 2030 3 days          Arterial Line                 Arterial Line 10/21/19 1215 Right Radial 2 days          Peripheral Intravenous Line                 Peripheral IV - Single Lumen 10/21/19 1250 20 G Left Forearm 2 days         Midline Catheter Insertion/Assessment  - Single Lumen 10/23/19 1151 Right cephalic vein (lateral side of arm) 18g x 8cm less than 1 day         Peripheral IV - Single Lumen 10/22/19 1930 20 G Anterior;Left Upper Arm less than 1 day                Physical Exam   Constitutional: He is oriented to person, place, and time. He appears well-nourished. No distress.   HENT:   Head: Normocephalic.   Eyes: Pupils are equal, round, and reactive to light.   Neck: No JVD present. No thyromegaly present.   Cardiovascular: Normal rate and regular rhythm. Exam reveals no friction rub.   No murmur heard.  Pulmonary/Chest: Effort normal. No respiratory distress. He has no wheezes.   Abdominal: Soft. He exhibits no distension. There is no tenderness.   Musculoskeletal: He exhibits no edema.   Neurological: He is alert and oriented to person, place, and time.   Skin:        ICD [employer]abe    Vitals reviewed.      Significant Labs: All pertinent lab  results from the last 24 hours have been reviewed.    Significant Imaging: Reviewed    Assessment and Plan:     * Sustained ventricular tachycardia  - Unable to obtain the events from the device interrogating (was cleared from prior interrogating)  - DDx TdP in the setting of having multiple medication could affect his QTc (anti psychotic, and SSRI); ischemic etiology; although non obstructive but diseases mLCX   - Continue on Amiodarone and Lidocaine current with repeated lab level of Lidocaine  - patient underwent VT ablation on 10/21, however patient continues to have VT  - CTS and heart failure consulted for advance options  - carvedilol switched to metoprolol      Acute hypoxemic respiratory failure  - Low concern for infection in the setting of no CXR findings suggestive of lobar consolidation- Stable currently on supplementation oxygen with goal of O2 sat > 90%   - Known to have MEGAN and using CPAP QHS - Continue     MEGAN (obstructive sleep apnea)  - CPAP QHS PRN     Leukocytosis  - New leukocytosis of 14 on 10/20  - Likely due to stress reaction from multiple shocks  - UA negative for UTI, chest x-ray without lobar consolidation, afebrile    CAD (coronary artery disease)  - Recent LHC showed patent Radial-OM; LIMA-LAD; TOD to distal right disease to mLCX with collaterals to RCA. No intervention.   - Continue on ASA   - Lipid panel reviewed    Essential hypertension  - Coreg changed to metoprolol     Diabetes mellitus with neurological manifestations  - Taking home insulin, will resume long and short acting insulin at lower doses     Acute on chronic systolic heart failure  - Acute exacerbation of heart failure could be from arrhythmia in the setting of having multiple V tach episodes, or from non-compliance with medication or diet restriction, less likely to be in ischemia in the absence of chest pain  - Prior TTE showed EF 15-20%with diastolic dysfunction.  - NYHA functional classification III, ACCF/AHA  Stage of Heart Failure is C.  - Continue on GDMT with metoprolol, nitrite and hydralazine           VTE Risk Mitigation (From admission, onward)         Ordered     IP VTE LOW RISK PATIENT  Once      10/18/19 2135     Place sequential compression device  Until discontinued      10/18/19 2135                Neil Partida MD  Cardiology  [facility]

## 2019-10-23 NOTE — ASSESSMENT & PLAN NOTE
Lázaro Sarah is a 64 y.o. M with CAD s/p CABG, ICM EF 15-20% with recurrent, drug refractory MMVT who presents for further evaluation.   - has known history of CABG  - TTE w/contrast 10/19/19: EF 23%, no apical thrombus noted, akinesis of apex and septal wall, trivial posterior pericardial effusion noted.   - pt has had various morphologies of MMVT suggestive numerous areas of foci for scar-related VT. Some of these include (1) RBBB positive concordance, right/superior axis TCL ~400, (2) LBBB, negative concordance, left/superior axis, TCL ~ 420 (3) RBBB positive concordance   - currently on lidocaine 3 mg/min and amiodarone 1 mg/min. Recommend decreasing dose to lidocoine 1 mg/min and amiodarone 0.5 mg/min. Lidocaine level is pending.  - [employer] Gabe ICD reprogrammed VT1 zone to detect tachyarrhythmias at 120 bpm.   - s/p VT ablation 10/21/19. Pt continues to have episodes of VT requiring shocks from his ICD  - would continue avoiding QT prolonging meds  - goal K > 4, Mg > 2  - continue tele monitoring

## 2019-10-23 NOTE — PLAN OF CARE
One shock overnight per AICD. Immediately recovered after shock. Patient overall scared and fearful of future shocked. Amio and lidocaine continue. POC discussed with pt in full detail, all concerns addressed. DANIS.

## 2019-10-23 NOTE — SUBJECTIVE & OBJECTIVE
Interval History: patient had additional shock during the night and once this morning. Cardiac device was interrogated. Advanced heart failure and CTS following for advance options.     Review of Systems   Constitution: Positive for decreased appetite and malaise/fatigue. Negative for chills and night sweats.   HENT: Negative for congestion.    Cardiovascular: Positive for chest pain (discomfort). Negative for irregular heartbeat and leg swelling.   Respiratory: Negative for cough and shortness of breath.    Endocrine: Negative for cold intolerance and heat intolerance.   Skin: Negative for rash.   Musculoskeletal: Negative for arthritis and back pain.   Gastrointestinal: Negative for abdominal pain, constipation and diarrhea.   Genitourinary: Negative for dysuria and hematuria.   Neurological: Negative for dizziness and headaches.   Psychiatric/Behavioral: Negative for altered mental status. The patient is nervous/anxious.      Objective:     Vital Signs (Most Recent):  Temp: 98.5 °F (36.9 °C) (10/23/19 1100)  Pulse: 60 (10/23/19 1300)  Resp: 18 (10/23/19 1300)  BP: (!) 114/53 (10/23/19 0856)  SpO2: 95 % (10/23/19 1300) Vital Signs (24h Range):  Temp:  [97 °F (36.1 °C)-98.8 °F (37.1 °C)] 98.5 °F (36.9 °C)  Pulse:  [60-98] 60  Resp:  [13-26] 18  SpO2:  [91 %-97 %] 95 %  BP: ()/(49-56) 114/53  Arterial Line BP: ()/(51-78) 120/52     Weight: 121.1 kg (267 lb)  Body mass index is 39.43 kg/m².     SpO2: 95 %  O2 Device (Oxygen Therapy): nasal cannula      Intake/Output Summary (Last 24 hours) at 10/23/2019 1349  Last data filed at 10/23/2019 1300  Gross per 24 hour   Intake 2751.52 ml   Output 1290 ml   Net 1461.52 ml       Lines/Drains/Airways     Drain                 Urethral Catheter 10/19/19 2030 3 days          Arterial Line                 Arterial Line 10/21/19 1215 Right Radial 2 days          Peripheral Intravenous Line                 Peripheral IV - Single Lumen 10/21/19 1250 20 G Left Forearm 2  days         Midline Catheter Insertion/Assessment  - Single Lumen 10/23/19 1151 Right cephalic vein (lateral side of arm) 18g x 8cm less than 1 day         Peripheral IV - Single Lumen 10/22/19 1930 20 G Anterior;Left Upper Arm less than 1 day                Physical Exam   Constitutional: He is oriented to person, place, and time. He appears well-nourished. No distress.   HENT:   Head: Normocephalic.   Eyes: Pupils are equal, round, and reactive to light.   Neck: No JVD present. No thyromegaly present.   Cardiovascular: Normal rate and regular rhythm. Exam reveals no friction rub.   No murmur heard.  Pulmonary/Chest: Effort normal. No respiratory distress. He has no wheezes.   Abdominal: Soft. He exhibits no distension. There is no tenderness.   Musculoskeletal: He exhibits no edema.   Neurological: He is alert and oriented to person, place, and time.   Skin:        ICD [employer] Gabe    Vitals reviewed.      Significant Labs: All pertinent lab results from the last 24 hours have been reviewed.    Significant Imaging: Reviewed

## 2019-10-23 NOTE — NURSING
"Notified Dr. Nereida Venegas that pt with weakly Dopplerable DP and PT pulses to right leg and weakly dopplerable left PT but cannot find Left leg DP pulse. Pt c/o numbness and tingling, like "pins and needles" to left leg. MD verbalizes understanding. Bilateral KISHAN ordered   "

## 2019-10-23 NOTE — PHYSICIAN QUERY
PT Name: Lázaro Sarah  MR #: [id number]    Physician Query Form - Respiratory Condition Clarification      CDS/: Tyra Deleon RN, CCDS             Contact information: [email or web address]  This form is a permanent document in the medical record.    Query Date: October 23, 2019    By submitting this query, we are merely seeking further clarification of documentation. Please utilize your independent clinical judgment when addressing the question(s) below.    The Medical record contains the following:   Indicators   Supporting Clinical Findings Location in Medical Record    Orthopnea, SOB, [place], Use of accessory muscles documented     X Hypoxia ,Hypoxemia, Hypoxic documented Acute hypoxemic resp F 10/19 h/p, 10/20-10/22 prog notes    Respiratory Distress documented      RR            PaO2                   PaCO2                 O2 sat      Treatment:     X Supplemental O2/ CPAP Use Known to have MEGAN and using -CPAP Q HS, continue 10/19 h/p    Oxygen dependence     X Obesity/ Morbid Obesity/ BMI Morbid obesity    BMI 39.6 10/21 anesthesia pre-op, 10/22 prog note  10/21 anthropometrics    Other:         Provider, please specify the diagnosis or diagnoses associated with above clinical findings.    [   ] Obesity hypoventilation syndrome   [   ] Obstructive Sleep Apnea   [ x  ] Obstructive Sleep Apnea with Obesity Hypoventilation Syndrome   [   ] Other Respiratory Diagnosis (Specify)    [  ] Clinically Undetermined

## 2019-10-23 NOTE — PROGRESS NOTES
I have seen the patient, reviewed the fellow's assessment and plan. I have personally interviewed and examined the patient at bedside and: agree with the findings.     Patient had another episode of VT last night requiring shock despite ablation, sedation, amiodarone and lidocaine.  Discussed with patient and family that I will go in sequential fashion with testing for vascular disease today--legs and carotids (or wait and perform only is no PAD in legs) since no good distal pulses noted on exam yesterday.    I received a call this afternoon from Dr. Davila re: PAD/ischemic leg.  She has spoken with family ad patient with decision made for DNR.  I do not believe that he is acceptable candidate for heart transplant or LVAD and support this recommendation.      [facility]  Heart Transplant  Progress Note    Patient Name: Lázaro Sarah  MRN: [id number]  Admission Date: 10/18/2019  Hospital Length of Stay: 5 days  Attending Physician: Rea Davila MD  Primary Care Provider: Cameron Arteaga DO  Principal Problem:Sustained ventricular tachycardia    Subjective:     Interval History: Overnight patient suffered from one shock at 1:18 am for monomorphic VT and failed ATP, he continues to be on amio 1, lido 1 and toprol xl 75.   Continues to have very poor pedal pulses     Continuous Infusions:   amiodarone in dextrose 5% 0.5 mg/min (10/23/19 1534)    dexmedetomidine (PRECEDEX) infusion 0.2 mcg/kg/hr (10/23/19 1449)    lidocaine 0.5 mg/min (10/23/19 1534)     Scheduled Meds:   aspirin  81 mg Oral Daily    dorzolamide  1 drop Both Eyes BID    gabapentin  300 mg Oral QHS    insulin aspart U-100  3 Units Subcutaneous TIDWM    isosorbide-hydrALAZINE 20-37.5 mg  1 tablet Oral TID    metoprolol succinate  75 mg Oral Daily    miconazole nitrate 2%   Topical (Top) BID    potassium chloride  20 mEq Oral Daily    timolol maleate 0.5%  1 drop Both Eyes BID     PRN Meds:ALPRAZolam, Dextrose 10%  Bolus, Dextrose 10% Bolus, Dextrose 10% Bolus, Dextrose 10% Bolus, diphenhydrAMINE-zinc acetate 2-0.1%, glucagon (human recombinant), glucagon (human recombinant), glucose, glucose, hydrOXYzine pamoate, insulin aspart U-100, lidocaine HCL 20 mg/ml (2%), sodium chloride 0.9%, sodium chloride 0.9%    Review of patient's allergies indicates:   Allergen Reactions    Ace inhibitors Swelling    Adhesive Hives    Entresto [sacubitril-valsartan] Swelling    Novolin 70/30 (semi-synthetic) Hives    Codeine Nausea And Vomiting    Statins-hmg-coa reductase inhibitors Swelling    Bupropion hcl Rash     Objective:     Vital Signs (Most Recent):  Temp: 98.5 °F (36.9 °C) (10/23/19 1100)  Pulse: 60 (10/23/19 1500)  Resp: (!) 25 (10/23/19 1500)  BP: (!) 114/53 (10/23/19 0856)  SpO2: (!) 94 % (10/23/19 1500) Vital Signs (24h Range):  Temp:  [97 °F (36.1 °C)-98.8 °F (37.1 °C)] 98.5 °F (36.9 °C)  Pulse:  [60-98] 60  Resp:  [13-26] 25  SpO2:  [92 %-97 %] 94 %  BP: (114)/(53) 114/53  Arterial Line BP: ()/(51-78) 124/55     Patient Vitals for the past 72 hrs (Last 3 readings):   Weight   10/21/19 0701 121.1 kg (267 lb)     Body mass index is 39.43 kg/m².      Intake/Output Summary (Last 24 hours) at 10/23/2019 1756  Last data filed at 10/23/2019 1300  Gross per 24 hour   Intake 2353.33 ml   Output 1095 ml   Net 1258.33 ml   Physical Exam   Constitutional: He is oriented to person, place, and time. He appears well-developed.   Eyes: EOM are normal.   Neck: Neck supple.   Cardiovascular: Normal rate and regular rhythm.   Pulmonary/Chest: Breath sounds normal. No respiratory distress.   Abdominal: Soft. Bowel sounds are normal.   Neurological: He is alert and oriented to person, place, and time.   Skin: Skin is warm and dry.          Significant Labs:  CBC:  Recent Labs   Lab 10/21/19  0319 10/22/19  0300 10/23/19  0126   WBC 14.80* 16.11* 14.19*   RBC 4.53* 4.24* 3.76*   HGB 12.8* 12.2* 10.8*   HCT 39.4* 37.3* 32.4*   *  438* 378*   MCV 87 88 86   MCH 28.3 28.8 28.7   MCHC 32.5 32.7 33.3     BNP:  Recent Labs   Lab 10/18/19  2151   *     CMP:  Recent Labs   Lab 10/21/19  0319  10/22/19  0300  10/23/19  0126 10/23/19  0957 10/23/19  1653   *   < > 297*   < > 161* 255* 218*   CALCIUM 9.8   < > 9.5   < > 9.1 9.2 9.2   ALBUMIN 2.9*  --  2.7*  --  2.3*  --   --    PROT 7.0  --  6.6  --  5.9*  --   --    *   < > 128*   < > 128* 127* 126*   K 3.6   < > 3.5   < > 3.6 3.8 3.8   CO2 27   < > 29   < > 25 26 27   CL 89*   < > 87*   < > 93* 93* 92*   BUN 41*   < > 34*   < > 25* 22 21   CREATININE 1.3   < > 1.4   < > 1.0 1.0 1.0   ALKPHOS 133  --  127  --  110  --   --    ALT 9*  --  8*  --  5*  --   --    AST 10  --  31  --  14  --   --    BILITOT 0.4  --  0.3  --  0.3  --   --     < > = values in this interval not displayed.      Coagulation:   Recent Labs   Lab 10/18/19  2321   INR 1.3*   APTT 28.0     LDH:  No results for input(s): LDH in the last 72 hours.  Microbiology:  Microbiology Results (last 7 days)     ** No results found for the last 168 hours. **        Assessment and Plan:     64 year old male with PMHx significant for ischemic CMP (LVEF 15-20%) s/p AICD, insulin dependent diabetes and CAD s/p CABG 3/2008 (Radial-OM, LIMA to LAD and TOD to dRCA) who [place] [facility] a transfer from OSH on 10/18/19 due to VT storm. Patient was shocked multiple times from his AICD for MMVT at 160 bpm. He was initially treated with amiodarone and lidocaine infusions. He underwent a LHC that showed patent Radial-OM; LIMA-LAD; TOD to distal right diseased with collaterals to RCA and 90% stenosis to mLCX. Ventriculogram showed LVEF 10-15%. No interventions occurred. He also underwent RHC with reported normal values. Of note, this was done at OSH where pt initially presented on 10/16/19.     On 10/17/2019, he developed recurrent episodes wide complex tachycardia. His ICD was re-programmed to detect slower VT rate of 120 bpm  given slow VT with a rate of 120-150 bpm with variable morphologies.     After being [place] [facility] [place], patient has gone into VT storm again. TTE was done that showed LVEF 23%, no apical thrombus noted, akinesis of apex and septal wall and trivial posterior pericardial effusion. EP was consulted and performed VT ablation for 4 of the 6 VT mapped. Currently patient is on lidocaine drip at 1 mg/hr (reduced given elevated lidocaine level) and amiodarone drip at 1 mg/min. He is also slightly sedated with precedex. HTS was consulted for advanced cardiac options evaluation. CTS was consulted for sympathectomy given incessant VT, per their assessment he is not a good candidate for sympathectomy, suggested stellate ganglion injection      * Sustained ventricular tachycardia  -MMVT likely due to large myocardial scar tissue from prior infarcts. Patient had recent LHC that did not show areas ameable to revascularization. He is s/p VT ablation but still having refractory VT despite also being on IV amiodarone and lidocaine.   -- Given his  psychiatric history he remains a poor candidate for heart transplant.        -- Recommend to evaluate for peripheral vascular disease given his  poor circulation, arterial ultrasound, carotid ultrasound. As significant PVD would be a challenge for advanced therapies.       Phyllis Ortega MD  Heart Transplant  [facility]

## 2019-10-23 NOTE — ASSESSMENT & PLAN NOTE
- Unable to obtain the events from the device interrogating (was cleared from prior interrogating)  - DDx TdP in the setting of having multiple medication could affect his QTc (anti psychotic, and SSRI); ischemic etiology; although non obstructive but diseases mLCX   - Continue on Amiodarone and Lidocaine current with repeated lab level of Lidocaine  - patient underwent VT ablation on 10/21, however patient continues to have VT  - CTS and heart failure consulted for advance options  - carvedilol switched to metoprolol

## 2019-10-23 NOTE — SUBJECTIVE & OBJECTIVE
"No current facility-administered medications on file prior to encounter.      No current outpatient medications on file prior to encounter.       Review of patient's allergies indicates:   Allergen Reactions    Ace inhibitors Swelling    Adhesive Hives    Entresto [sacubitril-valsartan] Swelling    Novolin 70/30 (semi-synthetic) Hives    Codeine Nausea And Vomiting    Statins-hmg-coa reductase inhibitors Swelling    Bupropion hcl Rash       Past Medical History:   Diagnosis Date    Acute on chronic systolic heart failure 10/16/2019    CAD (coronary artery disease)     Cancer     Skin Cancer     CHF (congestive heart failure)     Chronic rhinitis 10/4/2016    Depression 2016    Diabetes mellitus     Encounter for blood transfusion     during pt open heart surgery 1998    History of hypertension 10/4/2016    Hypertension     Implantable cardioverter-defibrillator (ICD) generator end of life 2018    Leukocytosis 10/16/2019     Past Surgical History:   Procedure Laterality Date    ABDOMINAL SURGERY      hernia repair    CARDIAC SURGERY      open heart    EYE SURGERY      HERNIA REPAIR      SKIN BIOPSY       Family History     None        Tobacco Use    Smoking status: Former Smoker     Types: Cigarettes     Last attempt to quit: 3/18/2006     Years since quittin.6   Substance and Sexual Activity    Alcohol use: Yes     Comment: per pt "once in a while"    Drug use: Yes     Comment: per pt "Carri on Percocet 2 yrs ago in July"    Sexual activity: Not Currently     Review of Systems   Reason unable to perform ROS: on small dose of precedex at time of evaluation.   Constitutional: Positive for fatigue.   Respiratory: Negative for cough and shortness of breath.    Cardiovascular: Positive for chest pain.   Gastrointestinal: Negative for abdominal pain.   Skin: Negative for color change.   Neurological: Negative for dizziness.   Psychiatric/Behavioral: Negative for agitation. The " patient is nervous/anxious.      Objective:     Vital Signs (Most Recent):  Temp: 98.5 °F (36.9 °C) (10/23/19 1100)  Pulse: 60 (10/23/19 1300)  Resp: 18 (10/23/19 1300)  BP: (!) 114/53 (10/23/19 0856)  SpO2: 95 % (10/23/19 1300) Vital Signs (24h Range):  Temp:  [97 °F (36.1 °C)-98.8 °F (37.1 °C)] 98.5 °F (36.9 °C)  Pulse:  [60-98] 60  Resp:  [13-26] 18  SpO2:  [92 %-97 %] 95 %  BP: ()/(52-53) 114/53  Arterial Line BP: ()/(51-78) 120/52     Weight: 121.1 kg (267 lb)  Body mass index is 39.43 kg/m².      Intake/Output - Last 3 Shifts       10/21 0700 - 10/22 0659 10/22 0700 - 10/23 0659 10/23 0700 - 10/24 0659    P.O. 360 360 340    I.V. (mL/kg) 1330 (11) 2409.8 (19.9) 398.6 (3.3)    Total Intake(mL/kg) 1690 (14) 2769.8 (22.9) 738.6 (6.1)    Urine (mL/kg/hr) 1994 (0.7) 1185 (0.4) 445 (0.4)    Stool 0 0     Total Output 1994 1185 445    Net -304 +1584.8 +293.6           Stool Occurrence 0 x 0 x           SpO2: 95 %  O2 Device (Oxygen Therapy): nasal cannula    Physical Exam   Constitutional: He appears well-developed and well-nourished.   Laying in bed  On precedex   Eyes: EOM are normal.   Cardiovascular: Normal rate.   Paced   Pulmonary/Chest: Effort normal.   Abdominal: Soft.   Musculoskeletal: Normal range of motion.   Neurological: He is alert.   Skin: Skin is warm and dry.   Psychiatric:   drowsy   Vitals reviewed.      Significant Labs:  BMP:   Recent Labs   Lab 10/23/19  0957   *   *   K 3.8   CL 93*   CO2 26   BUN 22   CREATININE 1.0   CALCIUM 9.2   MG 2.2     CBC:   Recent Labs   Lab 10/23/19  0126   WBC 14.19*   RBC 3.76*   HGB 10.8*   HCT 32.4*   *   MCV 86   MCH 28.7   MCHC 33.3       Significant Diagnostics:  CXR: I have reviewed all pertinent results/findings within the past 24 hours    VTE Risk Mitigation (From admission, onward)         Ordered     IP VTE LOW RISK PATIENT  Once      10/18/19 2135     Place sequential compression device  Until discontinued      10/18/19  8762

## 2019-10-23 NOTE — PLAN OF CARE
Pt remains in CMICU. No acute events today. AAOx4. Drowsy throughout the day. Paced @ 59. 4 L NC, CPAP @ night. BP low throughout the day, 1 L NS bolus administered. BG monitored AC/HS. CT chest completed. Bath administered. Wound care saw pt. Rash to groin and buttocks, ointment ordered. Afebrile.     CMICU DAILY GOALS       A: Awake    RASS: Goal - RASS Goal: 0-->alert and calm  Actual - RASS (Lema Agitation-Sedation Scale): -1-->drowsy   Restraint necessity:    B: Breath   SBT: Not intubated   C: Coordinate A & B, analgesics/sedatives   Pain: managed    SAT: Not intubated  D: Delirium   CAM-ICU: Overall CAM-ICU: Negative  E: Early Mobility   MOVE Screen: Fail   Activity: Activity Management: activity adjusted per tolerance, activity clustered for rest period  FAS: Feeding/Nutrition   Diet order: Diet/Nutrition Received: low saturated fat/low cholesterol,   Fluid restriction:    T: Thrombus   DVT prophylaxis: VTE Required Core Measure: Pharmacological prophylaxis initiated/maintained  H: HOB Elevation   Head of Bed (HOB): HOB at 15 degrees  U: Ulcer Prophylaxis   GI: yes  G: Glucose control   managed Glycemic Management: blood glucose monitoring  S: Skin   Bundle compliance: yes   Bathing/Skin Care: back care, bath, complete, dressed/undressed, incontinence care, linen changed(pt allergic to chlorhexidine. ) Date: 10 22 19  B: Bowel Function   constipation   I: Indwelling Catheters   Samson necessity: [REMOVED]      Urethral Catheter 10/17/19-Reason for Continuing Urinary Catheterization: Critically ill in ICU requiring intensive monitoring       Urethral Catheter 10/19/19 2030-Reason for Continuing Urinary Catheterization: Critically ill in ICU requiring intensive monitoring   CVC necessity: No   IPAD offered: Not appropriate  D: De-escalation Antibx   Yes  Plan for the day   Monitor HR, CT, consult to CTS and HTS.   Family/Goals of care/Code Status   Code Status: Full Code     No acute events throughout day,  VS and assessment per flow sheet, patient progressing towards goals as tolerated, plan of care reviewed with Lázaro Sarah and family, all concerns addressed, will continue to monitor.    Cecille Sky

## 2019-10-23 NOTE — PROGRESS NOTES
[facility]  Cardiac Electrophysiology  Progress Note    Admission Date: 10/18/2019  Code Status: Full Code   Attending Physician: Rea Davila MD   Expected Discharge Date: 10/24/2019  Principal Problem:Sustained ventricular tachycardia    Subjective:     Interval History: sustained episode of VT overnight 1:17:56-1:18:48 that failed ATP x 3 and was followed by 1 shock successfully converting in to NSR.    Review of Systems   Constitution: Positive for decreased appetite and malaise/fatigue. Negative for chills and night sweats.   HENT: Negative for congestion.    Cardiovascular: Positive for chest pain (discomfort). Negative for irregular heartbeat and leg swelling.   Respiratory: Negative for cough and shortness of breath.    Endocrine: Negative for cold intolerance and heat intolerance.   Skin: Negative for rash.   Musculoskeletal: Negative for arthritis and back pain.   Gastrointestinal: Negative for abdominal pain, constipation and diarrhea.   Genitourinary: Negative for dysuria and hematuria.   Neurological: Negative for dizziness and headaches.   Psychiatric/Behavioral: Negative for altered mental status. The patient is nervous/anxious.      Objective:     Vital Signs (Most Recent):  Temp: 98.8 °F (37.1 °C) (10/23/19 0700)  Pulse: 60 (10/23/19 0903)  Resp: 15 (10/23/19 0903)  BP: (!) 114/53 (10/23/19 0856)  SpO2: 95 % (10/23/19 0903) Vital Signs (24h Range):  Temp:  [97 °F (36.1 °C)-98.8 °F (37.1 °C)] 98.8 °F (37.1 °C)  Pulse:  [60] 60  Resp:  [13-25] 15  SpO2:  [91 %-98 %] 95 %  BP: ()/(49-56) 114/53  Arterial Line BP: ()/(51-78) 99/52     Weight: 121.1 kg (267 lb)  Body mass index is 39.43 kg/m².     SpO2: 95 %  O2 Device (Oxygen Therapy): nasal cannula w/ humidification    Physical Exam   Constitutional: He is oriented to person, place, and time. He appears well-nourished. No distress.   HENT:   Head: Normocephalic.   Eyes: Pupils are equal, round, and reactive to  light.   Neck: No JVD present. No thyromegaly present.   Cardiovascular: Normal rate and regular rhythm. Exam reveals no friction rub.   No murmur heard.  Pulmonary/Chest: Effort normal. No respiratory distress. He has no wheezes.   Abdominal: Soft. He exhibits no distension. There is no tenderness.   Musculoskeletal: He exhibits no edema.   Neurological: He is alert and oriented to person, place, and time.   Skin: ICD [employer]    Vitals reviewed.    Significant Labs:   CMP:   Recent Labs   Lab 10/22/19  0300 10/22/19  1355 10/23/19  0126   * 128* 128*   K 3.5 3.8 3.6   CL 87* 91* 93*   CO2 29 27 25   * 242* 161*   BUN 34* 31* 25*   CREATININE 1.4 1.1 1.0   CALCIUM 9.5 9.4 9.1   PROT 6.6  --  5.9*   ALBUMIN 2.7*  --  2.3*   BILITOT 0.3  --  0.3   ALKPHOS 127  --  110   AST 31  --  14   ALT 8*  --  5*   ANIONGAP 12 10 10   ESTGFRAFRICA >60.0 >60.0 >60.0   EGFRNONAA 52.7* >60.0 >60.0    and CBC:   Recent Labs   Lab 10/22/19  0300 10/23/19  0126   WBC 16.11* 14.19*   HGB 12.2* 10.8*   HCT 37.3* 32.4*   * 378*       Significant Imaging: n/a    Assessment and Plan:     * Sustained ventricular tachycardia  Lázaro Sarah is a 64 y.o. M with CAD s/p CABG, ICM EF 15-20% with recurrent, drug refractory MMVT who presents for further evaluation.   - has [employer]abe dual chamber ICD in place. See interrogation report for full programming details  - TTE 10/19/19: EF 23%, no apical thrombus noted, akinesis of apex and septal wall, trivial posterior pericardial effusion noted.   - pt has had various morphologies of MMVT suggestive numerous areas of foci for scar-related VT. Some of these include (1) RBBB positive concordance, right/superior axis TCL ~400, (2) LBBB, negative concordance, left/superior axis, TCL ~ 420 (3) RBBB positive concordance   - s/p VT ablation 10/21/19. VTs ablated. 6 distinct morphologies were noted including epicardial exits. Large anteroseptal and apical scar noted on mapping. ICE also  confirmed large aneurysmal apex.Pt continues to have episodes of VT requiring shocks from his ICD  - currently on lidocaine 1 mg/min and amiodarone 1 mg/min. Lidocaine level 7.6 in the setting of having lidocaine infusion running at 3/hr. 48 hours will end tonight at 1500. May consider down-titrating anti-arrhythmic drips as appropriate per primary.  - coreg 18.75mg BID changed to metoprolol 75 mg XL daily  - CTS consulted for cardiac sympathectomy. Appreciate recs  - would continue avoiding QT prolonging meds  - goal K > 4, Mg > 2  - continue tele monitoring     Patient seen and plan of care discussed with Dr. Dino Hernandez MD  Cardiac Electrophysiology  [facility]

## 2019-10-23 NOTE — SUBJECTIVE & OBJECTIVE
Interval History: sustained episode of VT overnight 1:17:56-1:18:48 that failed ATP x 3 and was followed by 1 shock successfully converting in to NSR.    Review of Systems   Constitution: Positive for decreased appetite and malaise/fatigue. Negative for chills and night sweats.   HENT: Negative for congestion.    Cardiovascular: Positive for chest pain (discomfort). Negative for irregular heartbeat and leg swelling.   Respiratory: Negative for cough and shortness of breath.    Endocrine: Negative for cold intolerance and heat intolerance.   Skin: Negative for rash.   Musculoskeletal: Negative for arthritis and back pain.   Gastrointestinal: Negative for abdominal pain, constipation and diarrhea.   Genitourinary: Negative for dysuria and hematuria.   Neurological: Negative for dizziness and headaches.   Psychiatric/Behavioral: Negative for altered mental status. The patient is nervous/anxious.      Objective:     Vital Signs (Most Recent):  Temp: 98.8 °F (37.1 °C) (10/23/19 0700)  Pulse: 60 (10/23/19 0903)  Resp: 15 (10/23/19 0903)  BP: (!) 114/53 (10/23/19 0856)  SpO2: 95 % (10/23/19 0903) Vital Signs (24h Range):  Temp:  [97 °F (36.1 °C)-98.8 °F (37.1 °C)] 98.8 °F (37.1 °C)  Pulse:  [60] 60  Resp:  [13-25] 15  SpO2:  [91 %-98 %] 95 %  BP: ()/(49-56) 114/53  Arterial Line BP: ()/(51-78) 99/52     Weight: 121.1 kg (267 lb)  Body mass index is 39.43 kg/m².     SpO2: 95 %  O2 Device (Oxygen Therapy): nasal cannula w/ humidification    Physical Exam   Constitutional: He is oriented to person, place, and time. He appears well-nourished. No distress.   HENT:   Head: Normocephalic.   Eyes: Pupils are equal, round, and reactive to light.   Neck: No JVD present. No thyromegaly present.   Cardiovascular: Normal rate and regular rhythm. Exam reveals no friction rub.   No murmur heard.  Pulmonary/Chest: Effort normal. No respiratory distress. He has no wheezes.   Abdominal: Soft. He exhibits no distension. There is  no tenderness.   Musculoskeletal: He exhibits no edema.   Neurological: He is alert and oriented to person, place, and time.   Skin:        ICD [employer]    Vitals reviewed.      Significant Labs:   CMP:   Recent Labs   Lab 10/22/19  0300 10/22/19  1355 10/23/19  0126   * 128* 128*   K 3.5 3.8 3.6   CL 87* 91* 93*   CO2 29 27 25   * 242* 161*   BUN 34* 31* 25*   CREATININE 1.4 1.1 1.0   CALCIUM 9.5 9.4 9.1   PROT 6.6  --  5.9*   ALBUMIN 2.7*  --  2.3*   BILITOT 0.3  --  0.3   ALKPHOS 127  --  110   AST 31  --  14   ALT 8*  --  5*   ANIONGAP 12 10 10   ESTGFRAFRICA >60.0 >60.0 >60.0   EGFRNONAA 52.7* >60.0 >60.0    and CBC:   Recent Labs   Lab 10/22/19  0300 10/23/19  0126   WBC 16.11* 14.19*   HGB 12.2* 10.8*   HCT 37.3* 32.4*   * 378*       Significant Imaging: n/a

## 2019-10-23 NOTE — ASSESSMENT & PLAN NOTE
- Acute exacerbation of heart failure could be from arrhythmia in the setting of having multiple V tach episodes, or from non-compliance with medication or diet restriction, less likely to be in ischemia in the absence of chest pain  - Prior TTE showed EF 15-20%with diastolic dysfunction.  - NYHA functional classification III, ACCF/AHA Stage of Heart Failure is C.  - Continue on GDMT with metoprolol, nitrite and hydralazine

## 2019-10-23 NOTE — PROCEDURES
Pacemaker Evaluation Report  October 23, 2019    : [employer]    Shock Configuration: RV to SVR & Can, Biphasic  Defib Max Sensitivity 0.5mV    Final Parameters  - 01:18 A.M.  - One episode of Monomorphic narrow complex V Tach; Failed ATP x3, slow VT @ 110bpm. Single AICD shock converted monomorphic VT into NSR.   Changes made: None

## 2019-10-23 NOTE — CONSULTS
Placed 18g, 8 cm Midline in right cephalic vein using u/s guidance.  Max dwell date 11/21/2019.  Lot # [id number].

## 2019-10-24 VITALS
HEART RATE: 60 BPM | SYSTOLIC BLOOD PRESSURE: 114 MMHG | WEIGHT: 267 LBS | HEIGHT: 69 IN | DIASTOLIC BLOOD PRESSURE: 64 MMHG | OXYGEN SATURATION: 95 % | TEMPERATURE: 98 F | BODY MASS INDEX: 39.55 KG/M2 | RESPIRATION RATE: 24 BRPM

## 2019-10-24 PROBLEM — Z51.5 PALLIATIVE CARE ENCOUNTER: Status: ACTIVE | Noted: 2019-10-24

## 2019-10-24 LAB
ALBUMIN SERPL BCP-MCNC: 2.2 G/DL (ref 3.5–5.2)
ALP SERPL-CCNC: 109 U/L (ref 55–135)
ALT SERPL W/O P-5'-P-CCNC: <5 U/L (ref 10–44)
ANION GAP SERPL CALC-SCNC: 9 MMOL/L (ref 8–16)
AST SERPL-CCNC: 11 U/L (ref 10–40)
BASOPHILS # BLD AUTO: 0.04 K/UL (ref 0–0.2)
BASOPHILS NFR BLD: 0.3 % (ref 0–1.9)
BILIRUB SERPL-MCNC: 0.4 MG/DL (ref 0.1–1)
BUN SERPL-MCNC: 19 MG/DL (ref 8–23)
CALCIUM SERPL-MCNC: 8.9 MG/DL (ref 8.7–10.5)
CHLORIDE SERPL-SCNC: 95 MMOL/L (ref 95–110)
CO2 SERPL-SCNC: 25 MMOL/L (ref 23–29)
CREAT SERPL-MCNC: 1 MG/DL (ref 0.5–1.4)
DIFFERENTIAL METHOD: ABNORMAL
EOSINOPHIL # BLD AUTO: 0.2 K/UL (ref 0–0.5)
EOSINOPHIL NFR BLD: 1.6 % (ref 0–8)
ERYTHROCYTE [DISTWIDTH] IN BLOOD BY AUTOMATED COUNT: 12.4 % (ref 11.5–14.5)
EST. GFR  (AFRICAN AMERICAN): >60 ML/MIN/1.73 M^2
EST. GFR  (NON AFRICAN AMERICAN): >60 ML/MIN/1.73 M^2
GLUCOSE SERPL-MCNC: 159 MG/DL (ref 70–110)
HCT VFR BLD AUTO: 29.2 % (ref 40–54)
HGB BLD-MCNC: 9.6 G/DL (ref 14–18)
IMM GRANULOCYTES # BLD AUTO: 0.15 K/UL (ref 0–0.04)
IMM GRANULOCYTES NFR BLD AUTO: 1.1 % (ref 0–0.5)
LYMPHOCYTES # BLD AUTO: 1.6 K/UL (ref 1–4.8)
LYMPHOCYTES NFR BLD: 11.4 % (ref 18–48)
MAGNESIUM SERPL-MCNC: 1.9 MG/DL (ref 1.6–2.6)
MCH RBC QN AUTO: 28.2 PG (ref 27–31)
MCHC RBC AUTO-ENTMCNC: 32.9 G/DL (ref 32–36)
MCV RBC AUTO: 86 FL (ref 82–98)
MONOCYTES # BLD AUTO: 1.1 K/UL (ref 0.3–1)
MONOCYTES NFR BLD: 8 % (ref 4–15)
NEUTROPHILS # BLD AUTO: 10.7 K/UL (ref 1.8–7.7)
NEUTROPHILS NFR BLD: 77.6 % (ref 38–73)
NRBC BLD-RTO: 0 /100 WBC
PHOSPHATE SERPL-MCNC: 2.7 MG/DL (ref 2.7–4.5)
PLATELET # BLD AUTO: 382 K/UL (ref 150–350)
PMV BLD AUTO: 10.5 FL (ref 9.2–12.9)
POCT GLUCOSE: 162 MG/DL (ref 70–110)
POCT GLUCOSE: 185 MG/DL (ref 70–110)
POCT GLUCOSE: 200 MG/DL (ref 70–110)
POTASSIUM SERPL-SCNC: 4.1 MMOL/L (ref 3.5–5.1)
PROT SERPL-MCNC: 5.7 G/DL (ref 6–8.4)
RBC # BLD AUTO: 3.41 M/UL (ref 4.6–6.2)
SODIUM SERPL-SCNC: 129 MMOL/L (ref 136–145)
WBC # BLD AUTO: 13.79 K/UL (ref 3.9–12.7)

## 2019-10-24 PROCEDURE — 94761 N-INVAS EAR/PLS OXIMETRY MLT: CPT

## 2019-10-24 PROCEDURE — 25000003 PHARM REV CODE 250: Performed by: STUDENT IN AN ORGANIZED HEALTH CARE EDUCATION/TRAINING PROGRAM

## 2019-10-24 PROCEDURE — 80053 COMPREHEN METABOLIC PANEL: CPT

## 2019-10-24 PROCEDURE — 85025 COMPLETE CBC W/AUTO DIFF WBC: CPT

## 2019-10-24 PROCEDURE — 27000221 HC OXYGEN, UP TO 24 HOURS

## 2019-10-24 PROCEDURE — 63600175 PHARM REV CODE 636 W HCPCS: Performed by: STUDENT IN AN ORGANIZED HEALTH CARE EDUCATION/TRAINING PROGRAM

## 2019-10-24 PROCEDURE — 84100 ASSAY OF PHOSPHORUS: CPT

## 2019-10-24 PROCEDURE — 99222 1ST HOSP IP/OBS MODERATE 55: CPT | Mod: ,,, | Performed by: CLINICAL NURSE SPECIALIST

## 2019-10-24 PROCEDURE — 99239 PR HOSPITAL DISCHARGE DAY,>30 MIN: ICD-10-PCS | Mod: GC,,, | Performed by: INTERNAL MEDICINE

## 2019-10-24 PROCEDURE — 99222 PR INITIAL HOSPITAL CARE,LEVL II: ICD-10-PCS | Mod: ,,, | Performed by: CLINICAL NURSE SPECIALIST

## 2019-10-24 PROCEDURE — 99239 HOSP IP/OBS DSCHRG MGMT >30: CPT | Mod: GC,,, | Performed by: INTERNAL MEDICINE

## 2019-10-24 PROCEDURE — 63600175 PHARM REV CODE 636 W HCPCS: Performed by: INTERNAL MEDICINE

## 2019-10-24 PROCEDURE — 99233 SBSQ HOSP IP/OBS HIGH 50: CPT | Mod: ,,, | Performed by: INTERNAL MEDICINE

## 2019-10-24 PROCEDURE — 83735 ASSAY OF MAGNESIUM: CPT

## 2019-10-24 PROCEDURE — 99233 PR SUBSEQUENT HOSPITAL CARE,LEVL III: ICD-10-PCS | Mod: ,,, | Performed by: INTERNAL MEDICINE

## 2019-10-24 RX ORDER — AMIODARONE HYDROCHLORIDE 400 MG/1
400 TABLET ORAL 2 TIMES DAILY
Qty: 60 TABLET | Refills: 3 | Status: SHIPPED | OUTPATIENT
Start: 2019-10-24 | End: 2020-10-23

## 2019-10-24 RX ORDER — LORAZEPAM 2 MG/ML
2 INJECTION INTRAMUSCULAR
Status: DISCONTINUED | OUTPATIENT
Start: 2019-10-24 | End: 2019-10-24 | Stop reason: HOSPADM

## 2019-10-24 RX ORDER — MEXILETINE HYDROCHLORIDE 150 MG/1
150 CAPSULE ORAL EVERY 12 HOURS
Qty: 60 CAPSULE | Refills: 2 | Status: SHIPPED | OUTPATIENT
Start: 2019-10-24 | End: 2020-10-23

## 2019-10-24 RX ORDER — CALCIUM CARBONATE 200(500)MG
500 TABLET,CHEWABLE ORAL DAILY PRN
Status: DISCONTINUED | OUTPATIENT
Start: 2019-10-24 | End: 2019-10-24 | Stop reason: HOSPADM

## 2019-10-24 RX ORDER — PROPRANOLOL HYDROCHLORIDE 60 MG/1
60 CAPSULE, EXTENDED RELEASE ORAL DAILY
Qty: 30 CAPSULE | Refills: 11 | Status: SHIPPED | OUTPATIENT
Start: 2019-10-24 | End: 2020-10-23

## 2019-10-24 RX ADMIN — INSULIN ASPART 2 UNITS: 100 INJECTION, SOLUTION INTRAVENOUS; SUBCUTANEOUS at 05:10

## 2019-10-24 RX ADMIN — LORAZEPAM 2 MG: 2 INJECTION INTRAMUSCULAR; INTRAVENOUS at 05:10

## 2019-10-24 RX ADMIN — POTASSIUM CHLORIDE 20 MEQ: 1500 TABLET, EXTENDED RELEASE ORAL at 09:10

## 2019-10-24 RX ADMIN — ASPIRIN 81 MG: 81 TABLET, COATED ORAL at 09:10

## 2019-10-24 RX ADMIN — HYDRALAZINE HYDROCHLORIDE AND ISOSORBIDE DINITRATE 1 TABLET: 37.5; 2 TABLET, FILM COATED ORAL at 04:10

## 2019-10-24 RX ADMIN — HYDRALAZINE HYDROCHLORIDE AND ISOSORBIDE DINITRATE 1 TABLET: 37.5; 2 TABLET, FILM COATED ORAL at 09:10

## 2019-10-24 RX ADMIN — INSULIN ASPART 3 UNITS: 100 INJECTION, SOLUTION INTRAVENOUS; SUBCUTANEOUS at 05:10

## 2019-10-24 RX ADMIN — INSULIN ASPART 2 UNITS: 100 INJECTION, SOLUTION INTRAVENOUS; SUBCUTANEOUS at 11:10

## 2019-10-24 RX ADMIN — TIMOLOL MALEATE 1 DROP: 5 SOLUTION OPHTHALMIC at 09:10

## 2019-10-24 RX ADMIN — MICONAZOLE NITRATE: 20 OINTMENT TOPICAL at 09:10

## 2019-10-24 RX ADMIN — INSULIN ASPART 3 UNITS: 100 INJECTION, SOLUTION INTRAVENOUS; SUBCUTANEOUS at 11:10

## 2019-10-24 RX ADMIN — INSULIN ASPART 2 UNITS: 100 INJECTION, SOLUTION INTRAVENOUS; SUBCUTANEOUS at 07:10

## 2019-10-24 RX ADMIN — METOPROLOL SUCCINATE 75 MG: 25 TABLET, EXTENDED RELEASE ORAL at 09:10

## 2019-10-24 RX ADMIN — AMIODARONE HYDROCHLORIDE 0.5 MG/MIN: 1.8 INJECTION, SOLUTION INTRAVENOUS at 12:10

## 2019-10-24 RX ADMIN — CALCIUM CARBONATE (ANTACID) CHEW TAB 500 MG 500 MG: 500 CHEW TAB at 02:10

## 2019-10-24 RX ADMIN — INSULIN ASPART 3 UNITS: 100 INJECTION, SOLUTION INTRAVENOUS; SUBCUTANEOUS at 07:10

## 2019-10-24 RX ADMIN — DEXMEDETOMIDINE HYDROCHLORIDE 0.4 MCG/KG/HR: 4 INJECTION, SOLUTION INTRAVENOUS at 05:10

## 2019-10-24 RX ADMIN — DORZOLAMIDE HYDROCHLORIDE 1 DROP: 20 SOLUTION/ DROPS OPHTHALMIC at 09:10

## 2019-10-24 NOTE — PROGRESS NOTES
LAPOST completed with pt. Bedside nurse aware. Called and spoke to cardiology fellow concerning LAPOST being signed by attending. Bedside nurse aware needs signature.     Will follow.     Radha DURÁN, APRN, AGCNS

## 2019-10-24 NOTE — SUBJECTIVE & OBJECTIVE
Interval History: Pt admitted with V-tach storm. Pt has AICD. Pt has EF 10-15%. Pt has left leg arterial ischemia.     Past Medical History:   Diagnosis Date    Acute on chronic systolic heart failure 10/16/2019    CAD (coronary artery disease)     Cancer     Skin Cancer     CHF (congestive heart failure)     Chronic rhinitis 10/4/2016    Depression 5/25/2016    Diabetes mellitus     Encounter for blood transfusion     during pt open heart surgery 1998    History of hypertension 10/4/2016    Hypertension     Implantable cardioverter-defibrillator (ICD) generator end of life 5/18/2018    Leukocytosis 10/16/2019       Past Surgical History:   Procedure Laterality Date    ABDOMINAL SURGERY      hernia repair    CARDIAC SURGERY      open heart    EYE SURGERY      HERNIA REPAIR      SKIN BIOPSY         Review of patient's allergies indicates:   Allergen Reactions    Ace inhibitors Swelling    Adhesive Hives    Entresto [sacubitril-valsartan] Swelling    Novolin 70/30 (semi-synthetic) Hives    Codeine Nausea And Vomiting    Statins-hmg-coa reductase inhibitors Swelling    Bupropion hcl Rash       Medications:  Continuous Infusions:   amiodarone in dextrose 5% 0.5 mg/min (10/24/19 1000)    dexmedetomidine (PRECEDEX) infusion 0.3 mcg/kg/hr (10/24/19 0900)    lidocaine 0.5 mg/min (10/24/19 0900)     Scheduled Meds:   aspirin  81 mg Oral Daily    dorzolamide  1 drop Both Eyes BID    gabapentin  300 mg Oral QHS    insulin aspart U-100  3 Units Subcutaneous TIDWM    isosorbide-hydrALAZINE 20-37.5 mg  1 tablet Oral TID    metoprolol succinate  75 mg Oral Daily    miconazole nitrate 2%   Topical (Top) BID    timolol maleate 0.5%  1 drop Both Eyes BID     PRN Meds:ALPRAZolam, Dextrose 10% Bolus, Dextrose 10% Bolus, diphenhydrAMINE-zinc acetate 2-0.1%, glucagon (human recombinant), glucose, glucose, hydrOXYzine pamoate, insulin aspart U-100, lidocaine HCL 20 mg/ml (2%), sodium chloride 0.9%,  "sodium chloride 0.9%    Family History     None        Tobacco Use    Smoking status: Former Smoker     Types: Cigarettes     Last attempt to quit: 3/18/2006     Years since quittin.6   Substance and Sexual Activity    Alcohol use: Yes     Comment: per pt "once in a while"    Drug use: Yes     Comment: per pt "Carri on Percocet 2 yrs ago in July"    Sexual activity: Not Currently       Review of Systems   Constitutional: Positive for activity change and fatigue.   Respiratory: Positive for shortness of breath.    Skin: Positive for pallor.   Neurological: Positive for weakness.     Objective:     Vital Signs (Most Recent):  Temp: 98.2 °F (36.8 °C) (10/24/19 0700)  Pulse: 60 (10/24/19 1000)  Resp: (!) 24 (10/24/19 1000)  BP: (!) 107/58 (10/24/19 1000)  SpO2: (!) 93 % (10/24/19 1000) Vital Signs (24h Range):  Temp:  [98.2 °F (36.8 °C)-99.1 °F (37.3 °C)] 98.2 °F (36.8 °C)  Pulse:  [60] 60  Resp:  [15-26] 24  SpO2:  [92 %-96 %] 93 %  BP: (106-142)/(58-69) 107/58  Arterial Line BP: ()/(47-64) 70/60     Weight: 121.1 kg (267 lb)  Body mass index is 39.43 kg/m².    Review of Symptoms  Symptom Assessment (ESAS 0-10 scale)   ESAS 0 1 2 3 4 5 6 7 8 9 10   Pain              Dyspnea    x          Anxiety     x         Nausea              Depression               Anorexia              Fatigue      x        Insomnia              Restlessness               Agitation              CAM / Delirium __ --  ___+   Constipation     __ --  ___+   Diarrhea           __ --  ___+  Bowel Management Plan (BMP): No      Pain Assessment: Pt does not endorse physical pain.       OME in 24 hours: 0    Performance Status: 40        Physical Exam   Constitutional: He is cooperative.   Pt oriented to person, place, time, and situation.    Cardiovascular: Normal rate and regular rhythm.   Pt has AICD.  Pt on amniodarone and lidocaine drip.    Abdominal: Soft. Normal appearance.   Neurological: He is alert.   Pt on precedex.    Skin: "   Lower extremities cool to touch.        Significant Labs: All pertinent labs within the past 24 hours have been reviewed.  CBC:   Recent Labs   Lab 10/24/19  0324   WBC 13.79*   HGB 9.6*   HCT 29.2*   MCV 86   *     BMP:  Recent Labs   Lab 10/24/19  0324   *   *   K 4.1   CL 95   CO2 25   BUN 19   CREATININE 1.0   CALCIUM 8.9   MG 1.9     LFT:  Lab Results   Component Value Date    AST 11 10/24/2019    ALKPHOS 109 10/24/2019    BILITOT 0.4 10/24/2019     Albumin:   Albumin   Date Value Ref Range Status   10/24/2019 2.2 (L) 3.5 - 5.2 g/dL Final     Protein:   Total Protein   Date Value Ref Range Status   10/24/2019 5.7 (L) 6.0 - 8.4 g/dL Final     Lactic acid:   Lab Results   Component Value Date    LACTATE 1.2 10/18/2019       Significant Imaging: I have reviewed all pertinent imaging results/findings within the past 24 hours.    Advance Care Planning   Advanced Directives::  Living Will: No  LaPOST: No  Do Not Resuscitate Status: Yes  Medical Power of : Pt's mother is in a  NH in [place]. She is blind and wheelchair-bound. Pt has sister, Izabel.     Decision-Making Capacity: Patient answered questions       Living Arrangements: Lives alone    Psychosocial/Cultural:  Pt is not . Pt lives alone. Pt's has a sister, Izabel who lives by pt. Pt's mother lives in a NH in [place]. She is blind and wheelchair-bound.       Spiritual: yes    F- Josette and Belief: Sikh    I - Importance:   .  C - Community:     A - Address in Care: Pt has been anointed.

## 2019-10-24 NOTE — NURSING
"Notified Dr. Elena that pt LLE still cool compared to RLE and unable to find LLE pedal pulse or posterior pulse with Doppler. KISHAN shows that they were also unable to find pedal or posterial tibial pulse in LLE. Pt c/o numbness and "cramping" pain to LLE. MD verbalizes understanding.     1600 - Dr. Elena and Dr. Davila at bedside attempting to doppler pulses in LLE. Unable to find pedal or posterior tibial pulse but able to doppler poplitial pulse in LLE. Dr. Davila discusses with pt that he is not a surgical candidate for heart transplant or sympathectomy. Pt states that he wishes to become a DNR and that if he is shocked again by his AICD he wants to be put on comfort care - turn off his AICD and pain meds and "let me go peacefully."   MDs verbalize understanding, DNR signed and placed in chart. No new orders at this time, will continue to monitor closely.   "

## 2019-10-24 NOTE — HPI
Pt is a 63 year old male with PMHx significant for ischemic CMP. EF= 10-15%  Pt is s/p AICD, depression (on SSRI/antipsychotics), obesity, insulin dependent diabetes with peripheral neuropathy, umbical hernia s/p repair, right ear basal cell carcinoma s/p excision, right retinal detachment and CAD s/p CABG 3/2008 (Radial-OM, LIMA to LAD and TOD to dRCA) who [place] [facility] [place] from OSH on 10/18/19 due to VT storm. Patient was shocked multiple times from his AICD for MMVT at 160 bpm. He was initially treated with amiodarone and lidocaine infusions. His VT stabilized and then was switched from lidocaine to mexiletine. He underwent a LHC that showed patent Radial-OM; LIMA-LAD; TOD to distal right diseased with collaterals to RCA and 90% stenosis to mLCX. Ventriculogram showed LVEF 10-15%. No interventions occurred. He also underwent RHC with reported normal values. Of note, this was done at OSH where pt initially presented on 10/16/19.      Per chart review, on 10/17/2019, he developed recurrent episodes wide complex tachycardia. His ICD was re-programmed to detect slower VT rate of 120 bpm given slow VT with a rate of 120-150 bpm with variable morphologies.      Per chart review, after being [place] [facility], patient has gone into VT storm again. TTE was done that showed LVEF 23%, no apical thrombus noted, akinesis of apex and septal wall and trivial posterior pericardial effusion. It was noted he had various morphologies of MMVT suggestive of numerous areas of foci for scar-related VT. [Morphologies include: (1) RBBB positive concordance, right/superior axis TCL ~400, (2) LBBB, negative concordance, left/superior axis, TCL ~ 420 (3) RBBB positive  concordance right/inferior axis ~ 470 (4) RBBB, changes axis at V3 small rQS in V1 suggestive of epicardiac foci]. EP was consulted and performed VT ablation for 4 of the 6 VT mapped. Currently patient is on lidocaine drip at 1 mg/hr (reduced given  elevated lidocaine level) and amiodarone drip at 1 mg/min. He is also slightly sedated with precedex. HTS is being consulted for advanced cardiac options evaluation. CTS is being consulted as well for sympathectomy given incessant VT.

## 2019-10-24 NOTE — CONSULTS
[facility]  Palliative Medicine  Consult Note    Patient Name: Lázaro Sarah  MRN: [id number]  Admission Date: 10/18/2019  Hospital Length of Stay: 6 days  Code Status: DNR   Attending Provider: Kobe Desir MD  Consulting Provider: PIYUSH Heller  Primary Care Physician: Cameron Arteaga DO  Principal Problem:Sustained ventricular tachycardia    Patient information was obtained from patient and ER records.      Inpatient consult to Palliative Care  Consult performed by: PIYUSH Nance  Consult ordered by: Nereida Venegas MD        Assessment/Plan:     Palliative care encounter  Impression: Pt is a 63 y/o male admitted for VT-storm. Pt has EF of 10%-15%. Pt has AICD in place- still activated. Pt's last shock was 10/23 at 1:19 AM. Pt has left leg arterial ischemia. Pt is on Amiodarone, Lidocaine drips. Pt is on precedex for anxiety. Pt is a DNR. Pt is alert, oriented to person, place, time, and situation.     Reason for consult: comfort care/goals of care. Spoke to Cardiology fellow. Per MD, pt's AICD still activated at this time.     Goals of care:     [id number]  Met with pt concerning goals of care. Per pt, he lives alone and has no one to care for him at home or given him meds when needed. Per pt, he has a sister in [facility] and his mother lives in [facility] and is blind and wheelchair-bound. Per pt, if possible, he would like to get closer to home. Per pt, he verbalized that he understands he is not a candidate for advanced options for advanced heart failure options.     AICD:  Pt reports he wants his AICD deactivated and would NOT want to leave it on if in transit to go back home.  Pt is aware if plan is to go back home, he could possible  on way home with an arrythmia event when AICD turned off. Per pt, he is aware and does not want AICD on any longer.      Per pt, he has been anointed by . Pt expressed fear/anxiety concerning symptoms he may  experience at EOL. Education and support provided.     Hospice Discussion:   Per pt, his goal is comfort/quality of life at this time and to be closer to family.   Hospice care discussed with pt. Per pt, he cannot go home with home hospice because he does not have a care-giver. Pt would like to be evaluated for inpt hospice care in [place]. [place] [facility] pt hospice hospitals.  Explained to pt would speak to cardiology about plan of care    [id number]    Met with pt's sister, mother and pt.  Plan of care discussed with pt and family. Pt would like AICD deactivated asap. Pt and pt's family would like inpt hospice care in [place].  Education provided.     Symptom management:     Pt on Precedex at this time for anxiety.   Pt reports he has been shocked so many times, he is anxious whenever he hears a medical device beep. Per pt,  He is also anxious about nearing EOL.      Recs:  Would have Ativan  1 mg IVP  q 1 hrs prn breakthrough anxiety.     Dyspnea:   SOB noted when pt speaking.   Would have available Morphine 1 mg IVP q 1 hr prn dyspnea or pain.     Plan:   Called and spoke to cardiology team concerning plan of care.  Spoke to Dr. Desir concerning inpt hospice placement  Per cardiology resident, se will consult SW for hospice.   AICD to be deactivated per pt's wishes.   See above for recs.           Thank you for your consult. I will follow-up with patient. Please contact us if you have any additional questions.    Subjective:     HPI:   Pt is a 63 year old male with PMHx significant for ischemic CMP. EF= 10-15%  Pt is s/p AICD, depression (on SSRI/antipsychotics), obesity, insulin dependent diabetes with peripheral neuropathy, umbical hernia s/p repair, right ear basal cell carcinoma s/p excision, right retinal detachment and CAD s/p CABG 3/2008 (Radial-OM, LIMA to LAD and TOD to dRCA) who presented to [facility] as a transfer from OSH on 10/18/19 due to VT storm. Patient was shocked multiple  times from his AICD for MMVT at 160 bpm. He was initially treated with amiodarone and lidocaine infusions. His VT stabilized and then was switched from lidocaine to mexiletine. He underwent a LHC that showed patent Radial-OM; LIMA-LAD; TOD to distal right diseased with collaterals to RCA and 90% stenosis to mLCX. Ventriculogram showed LVEF 10-15%. No interventions occurred. He also underwent RHC with reported normal values. Of note, this was done at OSH where pt initially presented on 10/16/19.      Per chart review, on 10/17/2019, he developed recurrent episodes wide complex tachycardia. His ICD was re-programmed to detect slower VT rate of 120 bpm given slow VT with a rate of 120-150 bpm with variable morphologies.      Per chart review, after being transferred to [facility], patient has gone into VT storm again. TTE was done that showed LVEF 23%, no apical thrombus noted, akinesis of apex and septal wall and trivial posterior pericardial effusion. It was noted he had various morphologies of MMVT suggestive of numerous areas of foci for scar-related VT. [Morphologies include: (1) RBBB positive concordance, right/superior axis TCL ~400, (2) LBBB, negative concordance, left/superior axis, TCL ~ 420 (3) RBBB positive  concordance right/inferior axis ~ 470 (4) RBBB, changes axis at V3 small rQS in V1 suggestive of epicardiac foci]. EP was consulted and performed VT ablation for 4 of the 6 VT mapped. Currently patient is on lidocaine drip at 1 mg/hr (reduced given elevated lidocaine level) and amiodarone drip at 1 mg/min. He is also slightly sedated with precedex. HTS is being consulted for advanced cardiac options evaluation. CTS is being consulted as well for sympathectomy given incessant VT.        Hospital Course:  No notes on file    Interval History: Pt admitted with V-tach storm. Pt has AICD. Pt has EF 10-15%. Pt has left leg arterial ischemia.     Past Medical History:   Diagnosis Date    Acute on chronic  systolic heart failure 10/16/2019    CAD (coronary artery disease)     Cancer     Skin Cancer     CHF (congestive heart failure)     Chronic rhinitis 10/4/2016    Depression 5/25/2016    Diabetes mellitus     Encounter for blood transfusion     during pt open heart surgery 1998    History of hypertension 10/4/2016    Hypertension     Implantable cardioverter-defibrillator (ICD) generator end of life 5/18/2018    Leukocytosis 10/16/2019       Past Surgical History:   Procedure Laterality Date    ABDOMINAL SURGERY      hernia repair    CARDIAC SURGERY      open heart    EYE SURGERY      HERNIA REPAIR      SKIN BIOPSY         Review of patient's allergies indicates:   Allergen Reactions    Ace inhibitors Swelling    Adhesive Hives    Entresto [sacubitril-valsartan] Swelling    Novolin 70/30 (semi-synthetic) Hives    Codeine Nausea And Vomiting    Statins-hmg-coa reductase inhibitors Swelling    Bupropion hcl Rash       Medications:  Continuous Infusions:   amiodarone in dextrose 5% 0.5 mg/min (10/24/19 1000)    dexmedetomidine (PRECEDEX) infusion 0.3 mcg/kg/hr (10/24/19 0900)    lidocaine 0.5 mg/min (10/24/19 0900)     Scheduled Meds:   aspirin  81 mg Oral Daily    dorzolamide  1 drop Both Eyes BID    gabapentin  300 mg Oral QHS    insulin aspart U-100  3 Units Subcutaneous TIDWM    isosorbide-hydrALAZINE 20-37.5 mg  1 tablet Oral TID    metoprolol succinate  75 mg Oral Daily    miconazole nitrate 2%   Topical (Top) BID    timolol maleate 0.5%  1 drop Both Eyes BID     PRN Meds:ALPRAZolam, Dextrose 10% Bolus, Dextrose 10% Bolus, diphenhydrAMINE-zinc acetate 2-0.1%, glucagon (human recombinant), glucose, glucose, hydrOXYzine pamoate, insulin aspart U-100, lidocaine HCL 20 mg/ml (2%), sodium chloride 0.9%, sodium chloride 0.9%    Family History     None        Tobacco Use    Smoking status: Former Smoker     Types: Cigarettes     Last attempt to quit: 3/18/2006     Years since quitting:  "13.6   Substance and Sexual Activity    Alcohol use: Yes     Comment: per pt "once in a while"    Drug use: Yes     Comment: per pt "Carri on Percocet 2 yrs ago in July"    Sexual activity: Not Currently       Review of Systems   Constitutional: Positive for activity change and fatigue.   Respiratory: Positive for shortness of breath.    Skin: Positive for pallor.   Neurological: Positive for weakness.     Objective:     Vital Signs (Most Recent):  Temp: 98.2 °F (36.8 °C) (10/24/19 0700)  Pulse: 60 (10/24/19 1000)  Resp: (!) 24 (10/24/19 1000)  BP: (!) 107/58 (10/24/19 1000)  SpO2: (!) 93 % (10/24/19 1000) Vital Signs (24h Range):  Temp:  [98.2 °F (36.8 °C)-99.1 °F (37.3 °C)] 98.2 °F (36.8 °C)  Pulse:  [60] 60  Resp:  [15-26] 24  SpO2:  [92 %-96 %] 93 %  BP: (106-142)/(58-69) 107/58  Arterial Line BP: ()/(47-64) 70/60     Weight: 121.1 kg (267 lb)  Body mass index is 39.43 kg/m².    Review of Symptoms  Symptom Assessment (ESAS 0-10 scale)   ESAS 0 1 2 3 4 5 6 7 8 9 10   Pain              Dyspnea    x          Anxiety     x         Nausea              Depression               Anorexia              Fatigue      x        Insomnia              Restlessness               Agitation              CAM / Delirium __ --  ___+   Constipation     __ --  ___+   Diarrhea           __ --  ___+  Bowel Management Plan (BMP): No      Pain Assessment: Pt does not endorse physical pain.       OME in 24 hours: 0    Performance Status: 40        Physical Exam   Constitutional: He is cooperative.   Pt oriented to person, place, time, and situation.    Cardiovascular: Normal rate and regular rhythm.   Pt has AICD.  Pt on amniodarone and lidocaine drip.    Abdominal: Soft. Normal appearance.   Neurological: He is alert.   Pt on precedex.    Skin:   Lower extremities cool to touch.        Significant Labs: All pertinent labs within the past 24 hours have been reviewed.  CBC:   Recent Labs   Lab 10/24/19  0324   WBC 13.79*   HGB 9.6* "   HCT 29.2*   MCV 86   *     BMP:  Recent Labs   Lab 10/24/19  0324   *   *   K 4.1   CL 95   CO2 25   BUN 19   CREATININE 1.0   CALCIUM 8.9   MG 1.9     LFT:  Lab Results   Component Value Date    AST 11 10/24/2019    ALKPHOS 109 10/24/2019    BILITOT 0.4 10/24/2019     Albumin:   Albumin   Date Value Ref Range Status   10/24/2019 2.2 (L) 3.5 - 5.2 g/dL Final     Protein:   Total Protein   Date Value Ref Range Status   10/24/2019 5.7 (L) 6.0 - 8.4 g/dL Final     Lactic acid:   Lab Results   Component Value Date    LACTATE 1.2 10/18/2019       Significant Imaging: I have reviewed all pertinent imaging results/findings within the past 24 hours.    Advance Care Planning   Advanced Directives::  Living Will: No  LaPOST: No  Do Not Resuscitate Status: Yes  Medical Power of : Pt's mother is in a  [place] in [place]. She is blind and wheelchair-bound. Pt has sister, Izabel.     Decision-Making Capacity: Patient answered questions       Living Arrangements: Lives alone    Psychosocial/Cultural:  Pt is not . Pt lives alone. Pt's has a sister, Izabel who lives by pt. Pt's mother lives in a NH in [place]. She is blind and wheelchair-bound.       Spiritual: yes    F- Josette and Belief: Amish    I - Importance:   .  C - Community:     A - Address in Care: Pt has been anointed.     70 minutes spent with pt and pt's family concerning goals of care, comfort care, hospice d/c, AICD deactivation.       Radha Quezada, CNS  Palliative Medicine  [facility]

## 2019-10-24 NOTE — ASSESSMENT & PLAN NOTE
- Acute exacerbation of heart failure could be from arrhythmia in the setting of having multiple V tach episodes, or from non-compliance with medication or diet restriction, less likely to be in ischemia in the absence of chest pain  - Prior TTE showed EF 15-20%with diastolic dysfunction.  - NYHA functional classification III, ACCF/AHA Stage of Heart Failure is C.  - transitioning patient to comfort care

## 2019-10-24 NOTE — PLAN OF CARE
CMICU DAILY GOALS       A: Awake    RASS: Goal - RASS Goal: 0-->alert and calm  Actual - RASS (Lema Agitation-Sedation Scale): 0-->alert and calm   Restraint necessity:    B: Breath   SBT: Not intubated   C: Coordinate A & B, analgesics/sedatives   Pain: managed    SAT: Not intubated  D: Delirium   CAM-ICU: Overall CAM-ICU: Negative  E: Early Mobility   MOVE Screen: Pass   Activity: Activity Management: activity adjusted per tolerance  FAS: Feeding/Nutrition   Diet order: Diet/Nutrition Received: low saturated fat/low cholesterol, no added salt, 2 gram sodium,   Fluid restriction:    T: Thrombus   DVT prophylaxis: VTE Required Core Measure: Pharmacological prophylaxis initiated/maintained  H: HOB Elevation   Head of Bed (HOB): HOB at 30-45 degrees  U: Ulcer Prophylaxis   GI: yes  G: Glucose control   managed Glycemic Management: blood glucose monitoring  S: Skin   Bundle compliance: no   Bathing/Skin Care: back care, bath, complete, dressed/undressed, incontinence care, linen changed(pt allergic to chlorhexidine. ) Date: 10/23/19  B: Bowel Function   constipation   I: Indwelling Catheters   Samson necessity: [REMOVED]      Urethral Catheter 10/17/19-Reason for Continuing Urinary Catheterization: Critically ill in ICU requiring intensive monitoring       Urethral Catheter 10/19/19 2030-Reason for Continuing Urinary Catheterization: Critically ill in ICU requiring intensive monitoring   CVC necessity: No   IPAD offered: Yes  D: De-escalation Antibx   N/A  Plan for the day   Pt to transition to comfort care if shocked by AICD per pt wishes   Family/Goals of care/Code Status   Code Status: DNR     No acute events throughout day, VS and assessment per flow sheet, patient progressing towards goals as tolerated, plan of care reviewed with Lázaro Sarah and family, all concerns addressed, will continue to monitor.    Zonia Ambrosio   No acute events throughout day, VS and assessment per flow sheet, see below for  updates:    Pulmonary: 2L NC. Sat 93-96%. Diminished breath sounds     Cardiovascular: 100% paced at 60 bpm. Maps 68-90. Afebrile. No pulses to LLE, dopplerable pulses to RLE - see nurses note for more details.     Neurological: AAOx4. PERRLA. Anxiety - Xanax PRN administered and Precedex gtt infusing.     Gastrointestinal: no BM this shift. CArdiac diet    Genitourinary: allison in place. ~800 cc umesh urine output    Endocrine: AC/HS. -235    Skin/Bath: rash to groin and perineal area   Date of last CHG bath given: 10/23/19    Infusions: Lidocaine @ 0.5 mg/min, Amio @ 0.5 mg/min, Precedex @ 0.24 mcg/kg/hr    Patient progressing towards goals as tolerated, plan of care communicated and reviewed with Lázaro Sarah and family, all concerns addressed, will continue to monitor.     Zonia Ambrosio RN

## 2019-10-24 NOTE — PLAN OF CARE
[facility]  Department of Hospital Medicine  [address] [place] [address]  [phone] after hours  [phone] fax    HOSPICE  ORDERS    10/24/2019    Admit to Hospice:  Inpatient Service    Diagnoses:   Active Hospital Problems    Diagnosis  POA    *Sustained ventricular tachycardia [I47.2]  Yes    Palliative care encounter [Z51.5]  Not Applicable    Anxiety [F41.9]  Unknown    Goals of care, counseling/discussion [Z71.89]  Not Applicable    Dyspnea [R06.00]  Unknown    PAD (peripheral artery disease) [I73.9]  Yes    Ischemic cardiomyopathy [I25.5]  Yes    Hyponatremia [E87.1]  Yes    Hyperlipidemia with target LDL less than 70 [E78.5]  Yes    Dermatitis associated with moisture [L30.8]  Yes    Ventricular tachycardia [I47.2]  Yes    Acute hypoxemic respiratory failure [J96.01]  Yes    Acute on chronic systolic heart failure [I50.23]  Yes    Diabetes mellitus with neurological manifestations [E11.49]  Yes    CAD (coronary artery disease) [I25.10]  Yes    Leukocytosis [D72.829]  Yes    Class 2 severe obesity due to excess calories with serious comorbidity and body mass index (BMI) of 39.0 to 39.9 in adult [E66.01, Z68.39]  Not Applicable    Essential hypertension [I10]  Yes    MEGAN (obstructive sleep apnea) [G47.33]  Yes      Resolved Hospital Problems   No resolved problems to display.       Hospice Qualifying Diagnoses:        Patient has a life expectancy < 6 months due to:  1) Primary Hospice Diagnosis:  Recurrent ventricular tachycardia, refractory to ablation. AICD turned off    Comorbid Conditions Contributing to Decline: HTN, PAD, CAD, Diabetes mellitus type II    Vital Signs: Routine per Hospice Protocol.    Code Status: DNR    Allergies:   Review of patient's allergies indicates:   Allergen Reactions    Ace inhibitors Swelling    Adhesive Hives    Entresto [sacubitril-valsartan] Swelling    Novolin 70/30 (semi-synthetic) Hives     Codeine Nausea And Vomiting    Statins-hmg-coa reductase inhibitors Swelling    Bupropion hcl Rash       Diet: Regular    Activities: As tolerated    Nursing: Per Hospice Routine.      Routine Skin for Bedridden Patients: Apply moisture barrier cream to all skin folds and   wet areas in perineal area daily and after baths and all bowel movements.      Oxygen: Nasal cannula as needed      Medications:      Lázaro Sarah   Home Medication Instructions SUDEEP:[id number]    Printed on:10/24/19 7804   Medication Information                      amiodarone (PACERONE) 400 MG tablet  Take 1 tablet (400 mg total) by mouth 2 (two) times daily x 2 weeks. Then 400 mg daily             baclofen (LIORESAL) 20 MG tablet  TAKE 1 TABLET BY MOUTH EVERY 12 HOURS FOR 10 DAYS             BD ALCOHOL SWABS PadM               dorzolamide-timolol 2-0.5% (COSOPT) 22.3-6.8 mg/mL ophthalmic solution               FLUoxetine 20 MG capsule  Take 60 mg by mouth.             gabapentin (NEURONTIN) 300 MG capsule  Take 300 mg by mouth 2 (two) times daily.             hydrOXYzine pamoate (VISTARIL) 50 MG Cap  TAKE 2 CAPSULES BY MOUTH ONCE DAILY AT BEDTIME             ketorolac (TORADOL) 10 mg tablet  TAKE 1 TABLET BY MOUTH EVERY 8 HOURS FOR 5 DAYS             mexiletine (MEXITIL) 150 MG Cap  Take 1 capsule (150 mg total) by mouth every 12 (twelve) hours.             NOVOLOG U-100 INSULIN ASPART 100 unit/mL injection               omeprazole (PRILOSEC) 20 MG capsule               propranolol (INDERAL LA) 60 MG 24 hr capsule  Take 1 capsule (60 mg total) by mouth once daily.             QUEtiapine (SEROQUEL) 100 MG Tab  Take 100 mg by mouth.                 Future Orders:  Hospice Medical Director may dictate new orders for comfortable care measures & sign death certificate.        _________________________________  Neil Partida MD  10/24/2019

## 2019-10-24 NOTE — ASSESSMENT & PLAN NOTE
Impression: Pt is a 63 y/o male admitted for VT-storm. Pt has EF of 10%-15%. Pt has AICD in place- still activated. Pt's last shock was 10/23 at 1:19 AM. Pt has left leg arterial ischemia. Pt is on Amiodarone, Lidocaine drips. Pt is on precedex for anxiety. Pt is a DNR. Pt is alert, oriented to person, place, time, and situation.     Reason for consult: comfort care/goals of care. Spoke to Cardiology fellow. Per MD, pt's AICD still activated at this time.     Goals of care:     [id number]  Met with pt concerning goals of care. Per pt, he lives alone and has no one to care for him at home or given him meds when needed. Per pt, he has a sister in [facility] and his mother lives in [facility] and is blind and wheelchair-bound. Per pt, if possible, he would like to get closer to home. Per pt, he verbalized that he understands he is not a candidate for advanced options for advanced heart failure options.     AICD:  Pt reports he wants his AICD deactivated and would NOT want to leave it on if in transit to go back home.  Pt is aware if plan is to go back home, he could possible  on way home with an arrythmia event when AICD turned off. Per pt, he is aware and does not want AICD on any longer.      Per pt, he has been anointed by . Pt expressed fear/anxiety concerning symptoms he may experience at EOL. Education and support provided.     Hospice Discussion:   Per pt, his goal is comfort/quality of life at this time and to be closer to family.   Hospice care discussed with pt. Per pt, he cannot go home with home hospice because he does not have a care-giver. Pt would like to be evaluated for inpt hospice care in [place]. [place] has [place] [facility] [place] [facility]  hospice hospitals.  Explained to pt would speak to cardiology about plan of care    [id number]    Met with pt's sister, mother and pt.  Plan of care discussed with pt and family. Pt would like AICD deactivated asap. Pt and pt's family would like inpt  hospice care in [place].  Education provided.     Symptom management:     Pt on Precedex at this time for anxiety.   Pt reports he has been shocked so many times, he is anxious whenever he hears a medical device beep. Per pt,  He is also anxious about nearing EOL.      Recs:  Would have Ativan  1 mg IVP  q 1 hrs prn breakthrough anxiety.     Dyspnea:   SOB noted when pt speaking.   Would have available Morphine 1 mg IVP q 1 hr prn dyspnea or pain.     Plan:   Called and spoke to cardiology team concerning plan of care.  Spoke to Dr. Desir concerning inpt hospice placement  Per cardiology resident, se will consult  for hospice.   AICD to be deactivated per pt's wishes.   See above for recs.

## 2019-10-24 NOTE — PLAN OF CARE
10/24/19 1436   Post-Acute Status   Post-Acute Authorization Home Health/Hospice   Home Health/Hospice Status Referrals Sent       PAUL faxed referral to [facility] in [place], [place] for inpatient hospice via  for review at the request of the Pt and Pt family. PAUL spoke with Warren from [facility] and they are going to verify insurance benefits and call the family about signing paperwork. PAUL will continue to follow.     Haley Avila LMSW  [facility]  Ext. [phone]

## 2019-10-24 NOTE — ASSESSMENT & PLAN NOTE
- Unable to obtain the events from the device interrogating (was cleared from prior interrogating)  - DDx TdP in the setting of having multiple medication could affect his QTc (anti psychotic, and SSRI); ischemic etiology; although non obstructive but diseases mLCX   - Continue on Amiodarone and Lidocaine inpatient. Oral Mexil and amio on discharge  - patient underwent VT ablation on 10/21, however patient continues to have VT  - No suitable advance options per advance heart failure. CTS recommended stellate ganglion injections however patient has decided to be comfort care  - carvedilol switched to metoprolol

## 2019-10-24 NOTE — PROGRESS NOTES
[facility]  Cardiology  Progress Note    Patient Name: Lázaro Sarah  MRN: [id number]  Admission Date: 10/18/2019  Hospital Length of Stay: 6 days  Code Status: DNR   Attending Physician: Kobe Desir MD   Primary Care Physician: Cameron Arteaga DO  Expected Discharge Date: 10/24/2019  Principal Problem:Sustained ventricular tachycardia    Subjective:     Hospital Course:   Patient admitted to CCU on 10/18 with concerns of multiple AICD shocks for his VT storm. Patient had continued amiodarone and lidocaine infusion with plans to undergo ablation on 10/21/19. Hospital course so far complicated by multiple shocks leading to severe patient discomfort. Symptoms managed with prn Xanax. Patient underwent ablation procedure on 10/21. He had 6 foci, 4 of which were ablated. Despite this, patient continued to have his AICD fire for vtach. Discussed with CTS and heart failure. Patient is not a candidate for advanced option. After speaking to the patient and his family, he has decided that he wants the AICD turned off and wants to transition to inpatient hospice closer to his home. Social work consult placed for this, will discharge patient on oral antiarrythmic medications.     Interval History: No acute events overnight. Transitioning patient to comfort care and inpatient hospice.     Review of Systems   Constitution: Positive for decreased appetite and malaise/fatigue. Negative for chills and night sweats.   HENT: Negative for congestion.    Cardiovascular: Positive for chest pain (discomfort). Negative for irregular heartbeat and leg swelling.   Respiratory: Negative for cough and shortness of breath.    Endocrine: Negative for cold intolerance and heat intolerance.   Skin: Negative for rash.   Musculoskeletal: Negative for arthritis and back pain.   Gastrointestinal: Negative for abdominal pain, constipation and diarrhea.   Genitourinary: Negative for dysuria and hematuria.   Neurological:  Negative for dizziness and headaches.   Psychiatric/Behavioral: Negative for altered mental status. The patient is nervous/anxious.      Objective:     Vital Signs (Most Recent):  Temp: 98.2 °F (36.8 °C) (10/24/19 1100)  Pulse: 60 (10/24/19 1200)  Resp: (!) 25 (10/24/19 1200)  BP: 114/66 (10/24/19 1200)  SpO2: (!) 94 % (10/24/19 1200) Vital Signs (24h Range):  Temp:  [98.2 °F (36.8 °C)-99.1 °F (37.3 °C)] 98.2 °F (36.8 °C)  Pulse:  [60] 60  Resp:  [15-26] 25  SpO2:  [92 %-96 %] 94 %  BP: (106-142)/(58-69) 114/66  Arterial Line BP: ()/(47-64) 70/60     Weight: 121.1 kg (267 lb)  Body mass index is 39.43 kg/m².     SpO2: (!) 94 %  O2 Device (Oxygen Therapy): nasal cannula      Intake/Output Summary (Last 24 hours) at 10/24/2019 1340  Last data filed at 10/24/2019 1200  Gross per 24 hour   Intake 1523.91 ml   Output 1805 ml   Net -281.09 ml       Lines/Drains/Airways     Drain                 Urethral Catheter 10/19/19 2030 4 days          Peripheral Intravenous Line                 Peripheral IV - Single Lumen 10/21/19 1250 20 G Left Forearm 3 days         Midline Catheter Insertion/Assessment  - Single Lumen 10/23/19 1151 Right cephalic vein (lateral side of arm) 18g x 8cm 1 day         Peripheral IV - Single Lumen 10/22/19 1930 20 G Anterior;Left Upper Arm 1 day                Physical Exam   Constitutional: He is oriented to person, place, and time. He appears well-nourished. No distress.   HENT:   Head: Normocephalic.   Eyes: Pupils are equal, round, and reactive to light.   Neck: No JVD present. No thyromegaly present.   Cardiovascular: Normal rate and regular rhythm. Exam reveals no friction rub.   No murmur heard.  Lower extremities cold, difficult to palpate pulses   Pulmonary/Chest: Effort normal. No respiratory distress. He has no wheezes.   Abdominal: Soft. He exhibits no distension. There is no tenderness.   Musculoskeletal: He exhibits no edema.   Neurological: He is alert and oriented to person,  place, and time.   Skin:        ICD [employer] Gabe    Vitals reviewed.      Significant Labs: All pertinent lab results from the last 24 hours have been reviewed.    Significant Imaging: Reviewed    Assessment and Plan:     * Sustained ventricular tachycardia  - Unable to obtain the events from the device interrogating (was cleared from prior interrogating)  - DDx TdP in the setting of having multiple medication could affect his QTc (anti psychotic, and SSRI); ischemic etiology; although non obstructive but diseases mLCX   - Continue on Amiodarone and Lidocaine inpatient. Oral Mexil and amio on discharge  - patient underwent VT ablation on 10/21, however patient continues to have VT  - No suitable advance options per advance heart failure. CTS recommended stellate ganglion injections however patient has decided to be comfort care  - carvedilol switched to metoprolol      Acute hypoxemic respiratory failure  - Low concern for infection in the setting of no CXR findings suggestive of lobar consolidation- Stable currently on supplementation oxygen with goal of O2 sat > 90%   - Known to have MEGAN and using CPAP QHS - Continue     MEGAN (obstructive sleep apnea)  - CPAP QHS PRN     Leukocytosis  - New leukocytosis of 14 on 10/20  - Likely due to stress reaction from multiple shocks  - UA negative for UTI, chest x-ray without lobar consolidation, afebrile    CAD (coronary artery disease)  - Recent LHC showed patent Radial-OM; LIMA-LAD; TOD to distal right disease to mLCX with collaterals to RCA. No intervention.   - transition to comfort care    Essential hypertension  - Coreg changed to metoprolol     Diabetes mellitus with neurological manifestations  - transition to comfort care    Acute on chronic systolic heart failure  - Acute exacerbation of heart failure could be from arrhythmia in the setting of having multiple V tach episodes, or from non-compliance with medication or diet restriction, less likely to be in ischemia in  the absence of chest pain  - Prior TTE showed EF 15-20%with diastolic dysfunction.  - NYHA functional classification III, ACCF/AHA Stage of Heart Failure is C.  - transitioning patient to comfort care           VTE Risk Mitigation (From admission, onward)         Ordered     IP VTE LOW RISK PATIENT  Once      10/18/19 2135     Place sequential compression device  Until discontinued      10/18/19 2135                Neil Partida MD  Cardiology  [facility]

## 2019-10-24 NOTE — ASSESSMENT & PLAN NOTE
- Recent LHC showed patent Radial-OM; LIMA-LAD; TOD to distal right disease to mLCX with collaterals to RCA. No intervention.   - transition to comfort care

## 2019-10-24 NOTE — PROGRESS NOTES
[facility]  Cardiac Electrophysiology  Progress Note    Admission Date: 10/18/2019  Code Status: DNR   Attending Physician: Kobe Desir MD   Expected Discharge Date: 10/24/2019  Principal Problem:Sustained ventricular tachycardia    Subjective:     Interval History: Yesterday, pt was noted to have decreased PT and DP pulses. Arterial ultrasound suggestive of PAD. HTS and CTS evaluated patient and he was not a surgical candidate for transplant or sympathetctomy at this time. Goals of care were addressed by CCU team and pt was made DNR. This morning he asks if we can turn off his ICD but he hasn't decided on if or when to do it. Palliative care consulted to further address goals of care today.     Review of Systems   Constitution: Positive for decreased appetite and malaise/fatigue. Negative for chills and night sweats.   HENT: Negative for congestion.    Cardiovascular: Negative for chest pain, irregular heartbeat and leg swelling.   Respiratory: Negative for cough and shortness of breath.    Endocrine: Negative for cold intolerance and heat intolerance.   Skin: Negative for rash.   Musculoskeletal: Negative for arthritis and back pain.   Gastrointestinal: Negative for abdominal pain, constipation and diarrhea.   Genitourinary: Negative for dysuria and hematuria.   Neurological: Negative for dizziness and headaches.   Psychiatric/Behavioral: Negative for altered mental status. The patient is nervous/anxious.      Objective:     Vital Signs (Most Recent):  Temp: 98.2 °F (36.8 °C) (10/24/19 0700)  Pulse: 60 (10/24/19 1000)  Resp: (!) 24 (10/24/19 1000)  BP: (!) 107/58 (10/24/19 1000)  SpO2: (!) 93 % (10/24/19 1000) Vital Signs (24h Range):  Temp:  [98.2 °F (36.8 °C)-99.1 °F (37.3 °C)] 98.2 °F (36.8 °C)  Pulse:  [60] 60  Resp:  [15-26] 24  SpO2:  [92 %-96 %] 93 %  BP: (106-142)/(58-69) 107/58  Arterial Line BP: ()/(47-64) 70/60     Weight: 121.1 kg (267 lb)  Body mass index is 39.43  kg/m².     SpO2: (!) 93 %  O2 Device (Oxygen Therapy): nasal cannula    Physical Exam   Constitutional: He is oriented to person, place, and time. He appears well-nourished. No distress.   HENT:   Head: Normocephalic.   Eyes: Pupils are equal, round, and reactive to light.   Neck: No JVD present. No thyromegaly present.   Cardiovascular: Normal rate and regular rhythm. Exam reveals no friction rub.   No murmur heard.  Pulmonary/Chest: Effort normal. No respiratory distress. He has no wheezes.   Abdominal: Soft. He exhibits no distension. There is no tenderness.   Musculoskeletal: He exhibits no edema.   Neurological: He is alert and oriented to person, place, and time.   Skin: ICD [employer] Gabe    Vitals reviewed.      Significant Labs:   CMP:   Recent Labs   Lab 10/23/19  0126 10/23/19  0957 10/23/19  1653 10/24/19  0324   * 127* 126* 129*   K 3.6 3.8 3.8 4.1   CL 93* 93* 92* 95   CO2 25 26 27 25   * 255* 218* 159*   BUN 25* 22 21 19   CREATININE 1.0 1.0 1.0 1.0   CALCIUM 9.1 9.2 9.2 8.9   PROT 5.9*  --   --  5.7*   ALBUMIN 2.3*  --   --  2.2*   BILITOT 0.3  --   --  0.4   ALKPHOS 110  --   --  109   AST 14  --   --  11   ALT 5*  --   --  <5*   ANIONGAP 10 8 7* 9   ESTGFRAFRICA >60.0 >60.0 >60.0 >60.0   EGFRNONAA >60.0 >60.0 >60.0 >60.0    and CBC:   Recent Labs   Lab 10/23/19  0126 10/24/19  0324   WBC 14.19* 13.79*   HGB 10.8* 9.6*   HCT 32.4* 29.2*   * 382*       Significant Imaging: n/a    Assessment and Plan:     * Sustained ventricular tachycardia  Lázaro Sarah is a 64 y.o. M with CAD s/p CABG, ICM EF 15-20% with recurrent, drug refractory MMVT who presents for further evaluation.   - has known history of CABG  - TTE w/contrast 10/19/19: EF 23%, no apical thrombus noted, akinesis of apex and septal wall, trivial posterior pericardial effusion noted.   - pt has had various morphologies of MMVT suggestive numerous areas of foci for scar-related VT. Some of these include (1) RBBB positive  concordance, right/superior axis TCL ~400, (2) LBBB, negative concordance, left/superior axis, TCL ~ 420 (3) RBBB positive concordance   - s/p VT ablation 10/21/19. Pt continues to have episodes of VT requiring shocks from his ICD. Last episode AM of 10/23/19.  - currently on lidocaine 0.5 mg/min and amiodarone 0.5 mg/min. Recommend starting PO mexiletine today, turning off lidocaine gtt tomorrow and switch amiodarone IV gtt to PO tomorrow.   - currently on metoprolol XL 75mg daily. Recommend switching metoprolol to propranolol today  - would continue avoiding QT prolonging meds  - goal K > 4, Mg > 2  - continue tele monitoring     Patient seen and plan of care discussed with Dr. Sun.    Taylor Hernandez MD  Cardiac Electrophysiology  [facility]

## 2019-10-24 NOTE — PLAN OF CARE
10/24/19 1608   Post-Acute Status   Post-Acute Authorization Home Health/Hospice   Home Health/Hospice Status Set-up Complete     Patient was accepted to [facility] inpatient hospice in [place], [place]. Patient will be transferring to [facility] today and will sign paperwork upon arrival.     4:31 PM  PAUL faxed updated Hospice orders to [facility] in [place], [place].     Haley Avila LMSW  [facility]  Ext. [phone]

## 2019-10-24 NOTE — PLAN OF CARE
10/24/19 1601   Post-Acute Status   Post-Acute Authorization Placement   Post-Acute Placement Status Set-up Complete       SW scheduled d/c transportation to [facility] in [place], [place] through [facility]. Patient is scheduled to be picked up at 5:00 pm. PAUL called and provided Pt nurse with updated about transportation being set up. Pt nurse will call report to [phone] (after 4:30 pm) or to [phone] (before 4:30 pm). PAUL is in communication with patient's CM, and patient's Care Team- CCU.     Haley Avila, [place]  [facility]  Ext. [phone]

## 2019-10-24 NOTE — DISCHARGE SUMMARY
[facility]  Cardiology  Discharge Summary      Patient Name: Lázaro Sarah  MRN: [id number]  Admission Date: 10/18/2019  Hospital Length of Stay: 6 days  Discharge Date and Time:  10/24/2019 4:53 PM  Attending Physician: Kobe Desir MD    Discharging Provider: Neil Partida MD  Primary Care Physician: Cameron Arteaga DO    HPI:   Mr. Sarah is a 63 y.o. Male with ischemic CMP EF 15-20%; CAD CABG 3/2008 (Radial-OM, LIMA to LAD and TOD to dRCA) AICD for HFrEF. He presented to [facility] on 10/16/2019 with multiple AICD shocks. He noticed the shock around 1030 PM the night of admission. During ED visit, he had reported episode of sustained monomorphic ventricular tachycardia 160 bpm. Per report he had multiple AICD shock for his VT storm, and he started on Amiodarone and Lidocaine infusion. Subsequently he VTac stabilized and he was switched from Lidocaine to mexiletine 150 mg TID. He underwent LHC and showed patent Radial-OM; LIMA-LAD; TOD to distal right disease to mLCX with collaterals to RCA. 90% stenosis to mLCX. Ventricualgraphy showed EF 10-15%/ No intervention. He also underwent RHC with reported normal results. On 10/17/2019; he developed recurrent episodes wide complex tachycardias mostly consistent with Ventricular tachycardia; and continue on Amiodarone and Lidocaine infusion. ICD re-programmed to detect slower VT rate of 120 given slow VT with a rate of 120-150 with variable morphologies. On arrival to [facility] CCU, he was hemodynamically stable and oxygen saturation on nasal cannula 2-4 L with 95%.    Procedure(s) (LRB):  Ablation, Scar VT (N/A)  Cardioversion or Defibrillation     Indwelling Lines/Drains at time of discharge:  Lines/Drains/Airways     Drain                 Urethral Catheter 10/19/19 2030 4 days                Hospital Course:  Patient admitted to CCU on 10/18 with concerns of multiple AICD shocks for his VT storm. Patient had continued  amiodarone and lidocaine infusion with plans to undergo ablation on 10/21/19. Hospital course so far complicated by multiple shocks leading to severe patient discomfort. Symptoms managed with prn Xanax. Patient underwent ablation procedure on 10/21. He had 6 foci, 4 of which were ablated. Despite this, patient continued to have his AICD fire for vtach. Discussed with CTS and heart failure. Patient is not a candidate for advanced option. After speaking to the patient and his family, he has decided that he wants the AICD turned off and wants to transition to inpatient hospice closer to his home. Social work consult placed for this, will discharge patient on oral antiarrythmic medications.     Consults:   Consults (From admission, onward)        Status Ordering Provider     Inpatient consult to Cardiothoracic Surgery  Once     Provider:  (Not yet assigned)    Completed GENNA HERNANDEZ     Inpatient consult to Electrophysiology  Once     Provider:  (Not yet assigned)    Completed VU SOFIA     Inpatient consult to Heart Transplant  Once     Provider:  (Not yet assigned)    Completed CLAIRE MARTINEZ     Inpatient consult to Midline team  Once     Provider:  (Not yet assigned)    Completed CLAIRE MARTINEZ     Inpatient consult to Palliative Care  Once     Provider:  (Not yet assigned)    Completed CLAIRE MARTINEZ     Inpatient consult to Social Work  Once     Provider:  (Not yet assigned)    Acknowledged GENNA HERNANDEZ          Significant Diagnostic Studies: Labs:   CMP   Recent Labs   Lab 10/23/19  0126 10/23/19  0957 10/23/19  1653 10/24/19  0324   * 127* 126* 129*   K 3.6 3.8 3.8 4.1   CL 93* 93* 92* 95   CO2 25 26 27 25   * 255* 218* 159*   BUN 25* 22 21 19   CREATININE 1.0 1.0 1.0 1.0   CALCIUM 9.1 9.2 9.2 8.9   PROT 5.9*  --   --  5.7*   ALBUMIN 2.3*  --   --  2.2*   BILITOT 0.3  --   --  0.4   ALKPHOS 110  --   --  109   AST 14  --   --  11   ALT 5*  --   --  <5*   ANIONGAP 10 8 7* 9   ESTGFRAFRICA  >60.0 >60.0 >60.0 >60.0   EGFRNONAA >60.0 >60.0 >60.0 >60.0    and CBC   Recent Labs   Lab 10/23/19  0126 10/24/19  0324   WBC 14.19* 13.79*   HGB 10.8* 9.6*   HCT 32.4* 29.2*   * 382*       Pending Diagnostic Studies:     Procedure Component Value Units Date/Time    Urinalysis [[id number]] Collected:  10/20/19 1123    Order Status:  Sent Lab Status:  In process Updated:  10/20/19 1123    Specimen:  Urine           Final Active Diagnoses:    Diagnosis Date Noted POA    PRINCIPAL PROBLEM:  Sustained ventricular tachycardia [I47.2] 10/16/2019 Yes    Palliative care encounter [Z51.5] 10/24/2019 Not Applicable    Anxiety [F41.9]  Unknown    Goals of care, counseling/discussion [Z71.89]  Not Applicable    Dyspnea [R06.00]  Unknown    PAD (peripheral artery disease) [I73.9]  Yes    Ischemic cardiomyopathy [I25.5]  Yes    Hyponatremia [E87.1]  Yes    Hyperlipidemia with target LDL less than 70 [E78.5]  Yes    Dermatitis associated with moisture [L30.8] 10/22/2019 Yes    Ventricular tachycardia [I47.2] 10/21/2019 Yes    Acute hypoxemic respiratory failure [J96.01] 10/18/2019 Yes    Acute on chronic systolic heart failure [I50.23] 10/16/2019 Yes    Diabetes mellitus with neurological manifestations [E11.49] 10/16/2019 Yes    CAD (coronary artery disease) [I25.10] 10/16/2019 Yes    Leukocytosis [D72.829] 10/16/2019 Yes    Class 2 severe obesity due to excess calories with serious comorbidity and body mass index (BMI) of 39.0 to 39.9 in adult [E66.01, Z68.39] 10/16/2019 Not Applicable    Essential hypertension [I10] 05/25/2016 Yes    MEGAN (obstructive sleep apnea) [G47.33] 05/25/2016 Yes      Problems Resolved During this Admission:     * Sustained ventricular tachycardia  - Unable to obtain the events from the device interrogating (was cleared from prior interrogating)  - DDx TdP in the setting of having multiple medication could affect his QTc (anti psychotic, and SSRI); ischemic etiology; although  non obstructive but diseases mLCX   - Continue on Amiodarone and Lidocaine inpatient. Oral Mexil and amio on discharge  - patient underwent VT ablation on 10/21, however patient continues to have VT  - No suitable advance options per advance heart failure. CTS recommended stellate ganglion injections however patient has decided to be comfort care  - carvedilol switched to metoprolol      Acute hypoxemic respiratory failure  - Low concern for infection in the setting of no CXR findings suggestive of lobar consolidation- Stable currently on supplementation oxygen with goal of O2 sat > 90%   - Known to have MEGAN and using CPAP QHS - Continue     MEGAN (obstructive sleep apnea)  - CPAP QHS PRN     Leukocytosis  - New leukocytosis of 14 on 10/20  - Likely due to stress reaction from multiple shocks  - UA negative for UTI, chest x-ray without lobar consolidation, afebrile    CAD (coronary artery disease)  - Recent LHC showed patent Radial-OM; LIMA-LAD; TOD to distal right disease to mLCX with collaterals to RCA. No intervention.   - transition to comfort care    Essential hypertension  - Coreg changed to metoprolol     Diabetes mellitus with neurological manifestations  - transition to comfort care    Acute on chronic systolic heart failure  - Acute exacerbation of heart failure could be from arrhythmia in the setting of having multiple V tach episodes, or from non-compliance with medication or diet restriction, less likely to be in ischemia in the absence of chest pain  - Prior TTE showed EF 15-20%with diastolic dysfunction.  - NYHA functional classification III, ACCF/AHA Stage of Heart Failure is C.  - transitioning patient to comfort care           Discharged Condition: poor    Disposition: Discharged to Other Facility (inpatient hospice)    Follow Up:    Patient Instructions:   No discharge procedures on file.  Medications:  Reconciled Home Medications:      Medication List      START taking these medications     amiodarone 400 MG tablet  Commonly known as:  PACERONE  Take 1 tablet (400 mg total) by mouth 2 (two) times daily.     mexiletine 150 MG Cap  Commonly known as:  MEXITIL  Take 1 capsule (150 mg total) by mouth every 12 (twelve) hours.     propranolol 60 MG 24 hr capsule  Commonly known as:  INDERAL LA  Take 1 capsule (60 mg total) by mouth once daily.        CONTINUE taking these medications    baclofen 20 MG tablet  Commonly known as:  LIORESAL  TAKE 1 TABLET BY MOUTH EVERY 12 HOURS FOR 10 DAYS     BD ALCOHOL SWABS Padm  Generic drug:  alcohol swabs     dorzolamide-timolol 2-0.5% 22.3-6.8 mg/mL ophthalmic solution  Commonly known as:  COSOPT     FLUoxetine 20 MG capsule  Take 60 mg by mouth.     gabapentin 300 MG capsule  Commonly known as:  NEURONTIN  Take 300 mg by mouth 2 (two) times daily.     hydrOXYzine pamoate 50 MG Cap  Commonly known as:  VISTARIL  TAKE 2 CAPSULES BY MOUTH ONCE DAILY AT BEDTIME     ketorolac 10 mg tablet  Commonly known as:  TORADOL  TAKE 1 TABLET BY MOUTH EVERY 8 HOURS FOR 5 DAYS     NovoLOG U-100 Insulin aspart 100 unit/mL injection  Generic drug:  insulin aspart U-100     omeprazole 20 MG capsule  Commonly known as:  PRILOSEC     QUEtiapine 100 MG Tab  Commonly known as:  SEROQUEL  Take 100 mg by mouth.        STOP taking these medications    amLODIPine 2.5 MG tablet  Commonly known as:  NORVASC     carvedilol 25 MG tablet  Commonly known as:  COREG     ergocalciferol 50,000 unit Cap  Commonly known as:  ERGOCALCIFEROL     FLUBLOK QUAD 9061-8103 (PF) 180 mcg (45 mcg x 4)/0.5 mL Syrg  Generic drug:  flu vac qv 2019(18yr up)rc(PF)     gemfibrozil 600 MG tablet  Commonly known as:  LOPID            Time spent on the discharge of patient: 30 minutes    Neil Partida MD  Cardiology  [facility]

## 2019-10-24 NOTE — SUBJECTIVE & OBJECTIVE
Interval History: Yesterday, pt was noted to have decreased PT and DP pulses. Arterial ultrasound suggestive of PAD. HTS and CTS evaluated patient and he was not a surgical candidate for transplant or sympathetctomy at this time. Goals of care were addressed by CCU team and pt was made DNR. This morning he asks if we can turn off his ICD but he hasn't decided on if or when to do it. Palliative care consulted to further address goals of care today.     Review of Systems   Constitution: Positive for decreased appetite and malaise/fatigue. Negative for chills and night sweats.   HENT: Negative for congestion.    Cardiovascular: Negative for chest pain, irregular heartbeat and leg swelling.   Respiratory: Negative for cough and shortness of breath.    Endocrine: Negative for cold intolerance and heat intolerance.   Skin: Negative for rash.   Musculoskeletal: Negative for arthritis and back pain.   Gastrointestinal: Negative for abdominal pain, constipation and diarrhea.   Genitourinary: Negative for dysuria and hematuria.   Neurological: Negative for dizziness and headaches.   Psychiatric/Behavioral: Negative for altered mental status. The patient is nervous/anxious.      Objective:     Vital Signs (Most Recent):  Temp: 98.2 °F (36.8 °C) (10/24/19 0700)  Pulse: 60 (10/24/19 1000)  Resp: (!) 24 (10/24/19 1000)  BP: (!) 107/58 (10/24/19 1000)  SpO2: (!) 93 % (10/24/19 1000) Vital Signs (24h Range):  Temp:  [98.2 °F (36.8 °C)-99.1 °F (37.3 °C)] 98.2 °F (36.8 °C)  Pulse:  [60] 60  Resp:  [15-26] 24  SpO2:  [92 %-96 %] 93 %  BP: (106-142)/(58-69) 107/58  Arterial Line BP: ()/(47-64) 70/60     Weight: 121.1 kg (267 lb)  Body mass index is 39.43 kg/m².     SpO2: (!) 93 %  O2 Device (Oxygen Therapy): nasal cannula    Physical Exam   Constitutional: He is oriented to person, place, and time. He appears well-nourished. No distress.   HENT:   Head: Normocephalic.   Eyes: Pupils are equal, round, and reactive to light.    Neck: No JVD present. No thyromegaly present.   Cardiovascular: Normal rate and regular rhythm. Exam reveals no friction rub.   No murmur heard.  Pulmonary/Chest: Effort normal. No respiratory distress. He has no wheezes.   Abdominal: Soft. He exhibits no distension. There is no tenderness.   Musculoskeletal: He exhibits no edema.   Neurological: He is alert and oriented to person, place, and time.   Skin:        ICD [employer] [facility]    Vitals reviewed.      Significant Labs:   CMP:   Recent Labs   Lab 10/23/19  0126 10/23/19  0957 10/23/19  1653 10/24/19  0324   * 127* 126* 129*   K 3.6 3.8 3.8 4.1   CL 93* 93* 92* 95   CO2 25 26 27 25   * 255* 218* 159*   BUN 25* 22 21 19   CREATININE 1.0 1.0 1.0 1.0   CALCIUM 9.1 9.2 9.2 8.9   PROT 5.9*  --   --  5.7*   ALBUMIN 2.3*  --   --  2.2*   BILITOT 0.3  --   --  0.4   ALKPHOS 110  --   --  109   AST 14  --   --  11   ALT 5*  --   --  <5*   ANIONGAP 10 8 7* 9   ESTGFRAFRICA >60.0 >60.0 >60.0 >60.0   EGFRNONAA >60.0 >60.0 >60.0 >60.0    and CBC:   Recent Labs   Lab 10/23/19  0126 10/24/19  0324   WBC 14.19* 13.79*   HGB 10.8* 9.6*   HCT 32.4* 29.2*   * 382*       Significant Imaging: n/a

## 2019-10-24 NOTE — PLAN OF CARE
10/24/19 1526   Discharge Reassessment   Assessment Type Discharge Planning Reassessment   Provided patient/caregiver education on the expected discharge date and the discharge plan Yes   Do you have any problems affording any of your prescribed medications? No   Discharge Plan A Inpatient Hospice   Discharge Plan B Hospice/home   DME Needed Upon Discharge  none   Anticipated Discharge Disposition [facility]   Can the patient answer the patient profile reliably? Yes, cognitively intact   How does the patient rate their overall health at the present time? Poor   Describe the patient's ability to walk at the present time. Walks with the help of equipment   How often would a person be available to care for the patient? Occasionally   Number of comorbid conditions (as recorded on the chart) Five or more   During the past month, has the patient often been bothered by feeling down, depressed or hopeless? No   During the past month, has the patient often been bothered by little interest or pleasure in doing things? No

## 2019-10-24 NOTE — PLAN OF CARE
CM[facility] DAILY GOALS       A: Awake    RASS: Goal - RASS Goal: 0-->alert and calm  Actual - RASS (Lema Agitation-Sedation Scale): -1-->drowsy   Restraint necessity:    B: Breath   SBT: Not intubated   C: Coordinate A & B, analgesics/sedatives   Pain: managed    SAT: Not intubated  D: Delirium   CAM-ICU: Overall CAM-ICU: Negative  E: Early Mobility   MOVE Screen: Pass   Activity: Activity Management: bedrest maintained per order  FAS: Feeding/Nutrition   Diet order: Diet/Nutrition Received: low saturated fat/low cholesterol,   Fluid restriction:    T: Thrombus   DVT prophylaxis: VTE Required Core Measure: Pharmacological prophylaxis initiated/maintained  H: HOB Elevation   Head of Bed (HOB): HOB at 30 degrees  U: Ulcer Prophylaxis   GI: no  G: Glucose control   managed Glycemic Management: blood glucose monitoring, supplemental insulin given  S: Skin   Bundle compliance: yes   Bathing/Skin Care: bath, complete, dressed/undressed, linen changed, shaved face(unable to give CHG bath due to rash from CHG ) Date: 10/23/2019-dayshift  B: Bowel Function   no issues   I: Indwelling Catheters   Samson necessity: [REMOVED]      Urethral Catheter 10/17/19-Reason for Continuing Urinary Catheterization: Critically ill in ICU requiring intensive monitoring       Urethral Catheter 10/19/19 2030-Reason for Continuing Urinary Catheterization: Critically ill in ICU requiring intensive monitoring   CVC necessity: Yes   IPAD offered: Yes  D: De-escalation Antibx   No  Plan for the day   Continue all drips, Lidocaine , Amiodarone and Precedex.              Pt prefers to go-up on sedation, he's afraid of another AICD shock and if happens team will turn -off AICD and transition to comfort care.                 Family/Goals of care/Code Status   Code Status: DNR     No acute events throughout day, VS and assessment per flow sheet.  Right radial very positional.  Magnesium -1.9, Dr. Mittal aware, no new order.  Patient progressing towards  goals as tolerated, plan of care reviewed with Lázaro Sarah and family, all concerns addressed, will continue to monitor.    Ines Monteiro

## 2019-10-24 NOTE — SUBJECTIVE & OBJECTIVE
Interval History: No acute events overnight. Transitioning patient to comfort care and inpatient hospice.     Review of Systems   Constitution: Positive for decreased appetite and malaise/fatigue. Negative for chills and night sweats.   HENT: Negative for congestion.    Cardiovascular: Positive for chest pain (discomfort). Negative for irregular heartbeat and leg swelling.   Respiratory: Negative for cough and shortness of breath.    Endocrine: Negative for cold intolerance and heat intolerance.   Skin: Negative for rash.   Musculoskeletal: Negative for arthritis and back pain.   Gastrointestinal: Negative for abdominal pain, constipation and diarrhea.   Genitourinary: Negative for dysuria and hematuria.   Neurological: Negative for dizziness and headaches.   Psychiatric/Behavioral: Negative for altered mental status. The patient is nervous/anxious.      Objective:     Vital Signs (Most Recent):  Temp: 98.2 °F (36.8 °C) (10/24/19 1100)  Pulse: 60 (10/24/19 1200)  Resp: (!) 25 (10/24/19 1200)  BP: 114/66 (10/24/19 1200)  SpO2: (!) 94 % (10/24/19 1200) Vital Signs (24h Range):  Temp:  [98.2 °F (36.8 °C)-99.1 °F (37.3 °C)] 98.2 °F (36.8 °C)  Pulse:  [60] 60  Resp:  [15-26] 25  SpO2:  [92 %-96 %] 94 %  BP: (106-142)/(58-69) 114/66  Arterial Line BP: ()/(47-64) 70/60     Weight: 121.1 kg (267 lb)  Body mass index is 39.43 kg/m².     SpO2: (!) 94 %  O2 Device (Oxygen Therapy): nasal cannula      Intake/Output Summary (Last 24 hours) at 10/24/2019 1340  Last data filed at 10/24/2019 1200  Gross per 24 hour   Intake 1523.91 ml   Output 1805 ml   Net -281.09 ml       Lines/Drains/Airways     Drain                 Urethral Catheter 10/19/19 2030 4 days          Peripheral Intravenous Line                 Peripheral IV - Single Lumen 10/21/19 1250 20 G Left Forearm 3 days         Midline Catheter Insertion/Assessment  - Single Lumen 10/23/19 1151 Right cephalic vein (lateral side of arm) 18g x 8cm 1 day          Peripheral IV - Single Lumen 10/22/19 1930 20 G Anterior;Left Upper Arm 1 day                Physical Exam   Constitutional: He is oriented to person, place, and time. He appears well-nourished. No distress.   HENT:   Head: Normocephalic.   Eyes: Pupils are equal, round, and reactive to light.   Neck: No JVD present. No thyromegaly present.   Cardiovascular: Normal rate and regular rhythm. Exam reveals no friction rub.   No murmur heard.  Lower extremities cold, difficult to palpate pulses   Pulmonary/Chest: Effort normal. No respiratory distress. He has no wheezes.   Abdominal: Soft. He exhibits no distension. There is no tenderness.   Musculoskeletal: He exhibits no edema.   Neurological: He is alert and oriented to person, place, and time.   Skin:        ICD [facility] [facility]    Vitals reviewed.      Significant Labs: All pertinent lab results from the last 24 hours have been reviewed.    Significant Imaging: Reviewed

## 2019-10-30 ENCOUNTER — TELEPHONE (OUTPATIENT)
Dept: ELECTROPHYSIOLOGY | Facility: CLINIC | Age: 64
End: 2019-10-30

## 2019-10-30 DIAGNOSIS — I49.8 OTHER SPECIFIED CARDIAC ARRHYTHMIAS: Primary | ICD-10-CM

## 2019-11-05 ENCOUNTER — TELEPHONE (OUTPATIENT)
Dept: INTERNAL MEDICINE | Facility: CLINIC | Age: 64
End: 2019-11-05

## 2019-11-05 NOTE — TELEPHONE ENCOUNTER
----- Message from Danielle Manuel sent at 11/5/2019  9:28 AM CST -----  Contact: Daina with [facility] pharmacy   Pharmacy is calling to clarify an RX.  RX name:  amiodarone (PACERONE) 400 MG tablet  What do they need to clarify:  Directions of usage   Comments:     [facility] - [place], [place] - [address] [phone] (Phone)  [phone] (Fax)

## 2019-11-05 NOTE — TELEPHONE ENCOUNTER
This message is addressed to you. You may have ordered this when you saw this patient in the hospital.  Please resend Rx. They are questioning directions. Not sure why because you have to take 1 tablet bid.

## 2021-05-25 NOTE — Clinical Note
Additional delay: anesthesia starting new PIV Quality 130: Documentation Of Current Medications In The Medical Record: Current Medications Documented Detail Level: Detailed

## 2022-06-26 NOTE — ASSESSMENT & PLAN NOTE
- Unable to obtain the events from the device interrogating (was cleared from prior interrogating)  - DDx TdP in the setting of having multiple medication could affect his QTc (anti psychotic, and SSRI); ischemic etiology; although non obstructive but diseases mLCX   - Continue on Amiodarone and Lidocaine current with repeated lab level of Lidocaine  - patient underwent VT ablation on 10/21, however patient continues to have VT  - CTS and heart failure consulted for advance options  - carvedilol switched to metoprolol     No

## 2024-01-09 NOTE — ASSESSMENT & PLAN NOTE
Lázaro Sarah is a 64 y.o. M with CAD s/p CABG, ICM EF 15-20% with recurrent, drug refractory MMVT who presents for further evaluation.   - has known history of CABG  - TTE w/contrast 10/19/19: EF 23%, no apical thrombus noted, akinesis of apex and septal wall, trivial posterior pericardial effusion noted.   - pt has had various morphologies of MMVT suggestive numerous areas of foci for scar-related VT. Some of these include (1) RBBB positive concordance, right/superior axis TCL ~400, (2) LBBB, negative concordance, left/superior axis, TCL ~ 420 (3) RBBB positive concordance   - s/p VT ablation 10/21/19. Pt continues to have episodes of VT requiring shocks from his ICD. Last episode AM of 10/23/19.  - currently on lidocaine 0.5 mg/min and amiodarone 0.5 mg/min. Recommend starting mexiletine today, turning off lidocain gtt off tomorrow and switch amiodarone IV gtt to PO tomorrow.   - currently on metoprolol XL 75mg daily. Recommend switching metoprolol to propranolol   - would continue avoiding QT prolonging meds  - goal K > 4, Mg > 2  - continue tele monitoring    none
